# Patient Record
Sex: FEMALE | Race: WHITE | NOT HISPANIC OR LATINO | Employment: PART TIME | URBAN - METROPOLITAN AREA
[De-identification: names, ages, dates, MRNs, and addresses within clinical notes are randomized per-mention and may not be internally consistent; named-entity substitution may affect disease eponyms.]

---

## 2023-11-09 ENCOUNTER — TELEPHONE (OUTPATIENT)
Dept: OBSTETRICS AND GYNECOLOGY | Facility: CLINIC | Age: 18
End: 2023-11-09
Payer: COMMERCIAL

## 2023-11-10 ENCOUNTER — OFFICE VISIT (OUTPATIENT)
Dept: OBSTETRICS AND GYNECOLOGY | Facility: CLINIC | Age: 18
End: 2023-11-10
Payer: COMMERCIAL

## 2023-11-10 VITALS
BODY MASS INDEX: 23.34 KG/M2 | SYSTOLIC BLOOD PRESSURE: 106 MMHG | HEIGHT: 70 IN | WEIGHT: 163 LBS | DIASTOLIC BLOOD PRESSURE: 64 MMHG

## 2023-11-10 DIAGNOSIS — F41.8 DEPRESSION WITH ANXIETY: ICD-10-CM

## 2023-11-10 DIAGNOSIS — Z30.09 ENCOUNTER FOR OTHER GENERAL COUNSELING OR ADVICE ON CONTRACEPTION: ICD-10-CM

## 2023-11-10 DIAGNOSIS — Z71.85 HPV VACCINE COUNSELING: ICD-10-CM

## 2023-11-10 DIAGNOSIS — Z11.3 SCREENING FOR STD (SEXUALLY TRANSMITTED DISEASE): Primary | ICD-10-CM

## 2023-11-10 DIAGNOSIS — Z01.419 WOMEN'S ANNUAL ROUTINE GYNECOLOGICAL EXAMINATION: ICD-10-CM

## 2023-11-10 NOTE — PROGRESS NOTES
Gynecologic Annual Exam Note        Establish Care        Subjective     HPI  Laura Thomas is a 18 y.o.  female who presents for annual well woman exam as a new patient. There were no changes to her medical or surgical history since her last visit.. Patient reports problems with: none. Patient's last menstrual period was 2023 (approximate).. Her periods occur every 25-35 days , lasting >7 days. The flow is light to moderate.. She reports dysmenorrhea is moderate, occurring first 1-2 days of flow. Partner Status: Marital Status: single.  She is sexually active. She has had new partners.. STD testing recommendations have been explained to the patient and she does desire STD testing.    She has a long history of depression and anxiety.  She sees a therapist regularly and has been on and off several different medications.  She is happy with her current mental status, declines discussion/med management/referral.    Additional OB/GYN History   Current contraception: contraceptive methods: Condoms  Desires to:  discuss  contraception  Thromboembolic Disease: none  Age of menarche: 11    History of STD: yes GC/CHLAMYDIA    Last Pap :  never    Gardasil status: not received  Family history of uterine, colon, breast, or ovarian cancer: yes - Breast: MGM  Performs monthly Self-Breast Exam: education provided  Exercises Regularly:no  Feelings of Anxiety or Depression: yes - pt has hx of anxiety and depression; would like to discuss  Tobacco Usage?: Pt vapes     No current outpatient medications on file.     Patient denies the need for medication refills today.    OB History          0    Para   0    Term   0       0    AB   0    Living   0         SAB   0    IAB   0    Ectopic   0    Molar   0    Multiple   0    Live Births   0                Health Maintenance   Topic Date Due    HPV VACCINES (1 - 2-dose series) Never done    TDAP/TD VACCINES (1 - Tdap) Never done    INFLUENZA VACCINE  2023  "   COVID-19 Vaccine (3 - 2023-24 season) 09/01/2023    HEPATITIS C SCREENING  Never done    ANNUAL PHYSICAL  Never done    CHLAMYDIA SCREENING  Never done    MENINGOCOCCAL VACCINE  Completed    Pneumococcal Vaccine 0-64  Aged Out       Past Medical History:   Diagnosis Date    Anxiety and depression     Gonorrhea 20021        History reviewed. No pertinent surgical history.    The additional following portions of the patient's history were reviewed and updated as appropriate: allergies, current medications, past family history, past medical history, past social history, past surgical history, and problem list.    Review of Systems   Constitutional: Negative.    HENT: Negative.     Eyes: Negative.    Respiratory: Negative.     Cardiovascular: Negative.    Gastrointestinal: Negative.    Endocrine: Negative.    Genitourinary: Negative.    Musculoskeletal: Negative.    Skin: Negative.    Allergic/Immunologic: Negative.    Neurological: Negative.    Hematological: Negative.    Psychiatric/Behavioral: Negative.           I have reviewed and agree with the HPI, ROS, and historical information as entered above. Jenna Mendosa, APRN          Objective   /64   Ht 177.8 cm (70\")   Wt 73.9 kg (163 lb)   LMP 11/01/2023 (Approximate)   BMI 23.39 kg/m²     Physical Exam  Vitals and nursing note reviewed. Exam conducted with a chaperone present.   Constitutional:       General: She is not in acute distress.     Appearance: Normal appearance. She is well-developed and normal weight. She is not ill-appearing.   Neck:      Thyroid: No thyroid mass or thyromegaly.   Pulmonary:      Effort: Pulmonary effort is normal. No respiratory distress or retractions.   Chest:      Chest wall: No mass.   Breasts:     Right: Normal. No mass, nipple discharge, skin change or tenderness.      Left: Normal. No mass, nipple discharge, skin change or tenderness.   Abdominal:      General: There is no distension.      Palpations: Abdomen is " soft. Abdomen is not rigid. There is no mass.      Tenderness: There is no abdominal tenderness. There is no guarding or rebound.      Hernia: No hernia is present. There is no hernia in the left inguinal area.   Genitourinary:     General: Normal vulva.      Labia:         Right: No rash, tenderness or lesion.         Left: No rash, tenderness or lesion.       Vagina: Normal. No vaginal discharge or lesions.      Cervix: Normal.      Uterus: Normal. Not enlarged, not fixed and not tender.       Adnexa: Right adnexa normal and left adnexa normal.        Right: No mass or tenderness.          Left: No mass or tenderness.        Rectum: No external hemorrhoid.   Musculoskeletal:      Cervical back: No muscular tenderness.   Skin:     General: Skin is warm and dry.   Neurological:      Mental Status: She is alert and oriented to person, place, and time.   Psychiatric:         Mood and Affect: Mood normal.         Behavior: Behavior normal.          Assessment and Plan    Problem List Items Addressed This Visit    None  Visit Diagnoses       Screening for STD (sexually transmitted disease)    -  Primary    Relevant Orders    Chlamydia trachomatis, Neisseria gonorrhoeae, Trichomonas vaginalis, PCR - Swab, Cervix    Women's annual routine gynecological examination        HPV vaccine counseling        Encounter for other general counseling or advice on contraception        Depression with anxiety                GYN annual well woman exam.   Reviewed pap guidelines.   Encouraged use of condoms for STD prevention.  Reviewed monthly self breast exams.  Instructed to call with lumps, pain, or breast discharge.    Reviewed exercise as a preventative health measures.   Reccommended Flu Vaccine in Fall of each year.  RTC in 1 year or PRN with problems  Return in about 1 year (around 11/10/2024) for Annual physical.  Rev Gardasil vaccine; she declines.  Offered to review contraceptive options; she declines and will continue  condoms.  She does not want to discuss mental health today.  See note above.      Jenna Mendosa, APRN  11/10/2023

## 2023-11-14 LAB
C TRACH RRNA SPEC QL NAA+PROBE: NEGATIVE
N GONORRHOEA RRNA SPEC QL NAA+PROBE: NEGATIVE
T VAGINALIS RRNA SPEC QL NAA+PROBE: NEGATIVE

## 2023-11-29 ENCOUNTER — OFFICE VISIT (OUTPATIENT)
Dept: OBSTETRICS AND GYNECOLOGY | Facility: CLINIC | Age: 18
End: 2023-11-29
Payer: COMMERCIAL

## 2023-11-29 VITALS
DIASTOLIC BLOOD PRESSURE: 60 MMHG | SYSTOLIC BLOOD PRESSURE: 110 MMHG | WEIGHT: 164.4 LBS | HEIGHT: 70 IN | BODY MASS INDEX: 23.54 KG/M2

## 2023-11-29 DIAGNOSIS — N90.9 LESION OF FEMALE PERINEUM: Primary | ICD-10-CM

## 2023-11-29 DIAGNOSIS — A60.04 HERPES, VULVAR: Primary | ICD-10-CM

## 2023-11-29 RX ORDER — VALACYCLOVIR HYDROCHLORIDE 1 G/1
1000 TABLET, FILM COATED ORAL 2 TIMES DAILY
Qty: 20 TABLET | Refills: 0 | Status: SHIPPED | OUTPATIENT
Start: 2023-11-29

## 2023-11-29 RX ORDER — VALACYCLOVIR HYDROCHLORIDE 1 G/1
1000 TABLET, FILM COATED ORAL DAILY
Qty: 30 TABLET | Refills: 12 | Status: SHIPPED | OUTPATIENT
Start: 2023-11-29

## 2023-11-29 NOTE — PROGRESS NOTES
Chief Complaint   Patient presents with    Vulvar Issues         Subjective   HPI  Laura Thomas is a 18 y.o. female, , who presents with her mother today for evaluation of vulvar lesion(s). This is the first occurrence.    She states she has experienced this problem for 5 days.  Since she first discovered the issue it has worsened . She describes the severity as localized but severe. Patient notes aggravating factors include sitting and alleviating factors are keeping dry.  The patient reports additional symptoms as none.       Patient's last menstrual period was 2023 (approximate)..  Partner Status: single.  New Partners since last visit: no.  Desires STD Screenin/10/2023-Negative. Her STD history is significant for:  chlamydia, GC.     She states that she had a partner a few months ago that was older and was a one time thing, they didn't use a condom, but this is the only time that she hadn't.       Additional OB/GYN History   Last Pap :   Last Completed Pap Smear       This patient has no relevant Health Maintenance data.            Tobacco Usage?: No       Current Outpatient Medications:     valACYclovir (Valtrex) 1000 MG tablet, Take 1 tablet by mouth 2 (Two) Times a Day., Disp: 20 tablet, Rfl: 0    valACYclovir (Valtrex) 1000 MG tablet, Take 1 tablet by mouth Daily. Start after taking treatment dose, Disp: 30 tablet, Rfl: 12     Past Medical History:   Diagnosis Date    Anxiety and depression     Gonorrhea         History reviewed. No pertinent surgical history.    The additional following portions of the patient's history were reviewed and updated as appropriate: allergies, current medications, past family history, past medical history, past social history, and past surgical history.    Review of Systems   Constitutional: Negative.    Respiratory: Negative.     Cardiovascular: Negative.    Gastrointestinal: Negative.    Genitourinary:  Positive for genital sores.  "  Psychiatric/Behavioral: Negative.         I have reviewed and agree with the HPI, ROS, and historical information as entered above. NAS Leung      Objective   /60 (BP Location: Right arm, Patient Position: Sitting, Cuff Size: Adult)   Ht 177.8 cm (70\")   Wt 74.6 kg (164 lb 6.4 oz)   LMP 11/01/2023 (Approximate)   BMI 23.59 kg/m²     Physical Exam  Vitals and nursing note reviewed. Exam conducted with a chaperone present.   Constitutional:       Appearance: Normal appearance. She is normal weight.   Pulmonary:      Effort: Pulmonary effort is normal.   Genitourinary:     Exam position: Lithotomy position.      Labia:         Right: Lesion present.         Left: Lesion present.           Comments: Lesions present bilateral inner labia. Ulcerated with yellow base and red border. Culture obtained from both lesions.   Speculum exam not performed.   Skin:     General: Skin is warm and dry.   Neurological:      Mental Status: She is alert and oriented to person, place, and time.   Psychiatric:         Attention and Perception: Attention normal.         Mood and Affect: Affect is tearful.         Behavior: Behavior is cooperative.      Comments: Tearful after mother's outburst and exit from the room.          After exam, mother was visibly agitated. Asked me to talk to patient about birth control. The patient declined, saying she didn't want to talk about it right now due to the nature of the visit, and that she isn't having sex right now.  Mother became aggressive and started using profane language towards her daughter.   APRN let the mother know it is inappropriate to talk to her daughter in that way, especially in a medical office, and will not be tolerated. She exited the room of her own volition.       Assessment & Plan         Problem List Items Addressed This Visit       Herpes, vulvar - Primary    Relevant Medications    valACYclovir (Valtrex) 1000 MG tablet    valACYclovir (Valtrex) 1000 MG " tablet         Plan       Discussed HSV diagnosis. Lesions are c/w HSV2. Culture was sent, but we will go ahead and treat now.  Discussed suppressive dose after treatment dose, pt will take daily to prevent outbreaks.   Discussed safe sex practices in detail. She can spread HSV even while using condoms while it is active, so should refrain from sex altogether when having an outbreak.   Mother mentioned birth control- pt is not interested in this at this point. She is not currently sexually active. She understands that without being on birth control of some sort, she is not protected against pregnancy and needs to at the minimum use condoms, which she states she will be using every time in the future anyways. She is not currently interested in talking about other options than OCP, but may be interested in IUD/nexplanon in the future. Encouraged pt to come in when she wants to discuss it or is going to become sexually active again.         Verona Joiner, APRN  11/29/2023

## 2023-11-30 PROBLEM — A60.04 HERPES, VULVAR: Status: ACTIVE | Noted: 2023-11-30

## 2023-12-04 LAB
HSV1 DNA SPEC QL NAA+PROBE: NEGATIVE
HSV2 DNA SPEC QL NAA+PROBE: NEGATIVE
SPECIMEN STATUS: NORMAL

## 2024-01-29 ENCOUNTER — TELEPHONE (OUTPATIENT)
Dept: OBSTETRICS AND GYNECOLOGY | Facility: CLINIC | Age: 19
End: 2024-01-29
Payer: COMMERCIAL

## 2024-01-29 DIAGNOSIS — N76.0 ACUTE VAGINITIS: Primary | ICD-10-CM

## 2024-01-29 RX ORDER — FLUCONAZOLE 150 MG/1
TABLET ORAL
Qty: 2 TABLET | Refills: 1 | Status: SHIPPED | OUTPATIENT
Start: 2024-01-29

## 2024-01-29 NOTE — TELEPHONE ENCOUNTER
Attempted to call pt but it says number not in service. Rx sent- however if this does not resolve she needs to come in and have cultures done.

## 2024-02-19 ENCOUNTER — OFFICE VISIT (OUTPATIENT)
Age: 19
End: 2024-02-19
Payer: COMMERCIAL

## 2024-02-19 DIAGNOSIS — F32.A DEPRESSION, UNSPECIFIED DEPRESSION TYPE: Primary | ICD-10-CM

## 2024-02-19 DIAGNOSIS — F41.9 ANXIETY DISORDER, UNSPECIFIED TYPE: ICD-10-CM

## 2024-02-19 PROCEDURE — 90832 PSYTX W PT 30 MINUTES: CPT
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
## 2024-02-27 ENCOUNTER — OFFICE VISIT (OUTPATIENT)
Age: 19
End: 2024-02-27
Payer: COMMERCIAL

## 2024-02-27 DIAGNOSIS — F32.A DEPRESSION, UNSPECIFIED DEPRESSION TYPE: Primary | ICD-10-CM

## 2024-02-27 NOTE — PROGRESS NOTES
Follow Up Adult Note     Date:2024   Client Name: Laura Thomas  : 2005   MRN: [id number]   Time IN: 1:29pm    Time OUT: 1:16pm     Referring Provider: Torri Morfin MD    Chief Complaint:      ICD-10-CM ICD-9-CM   1. Depression, unspecified depression type  F32.A 311        History of Present Illness:   Laura Thomas is a 18 y.o. female who is being seen today for follow up individual psychotherapy counseling for depressive symptoms.  Pt shared that she has continued to feel a lack of energy or low motivation to engage in normal activities.  Pt shared that she would like to find a job but finds the task difficult due to transportation barriers and lack of general interest or desire.       Objective     PHQ-9 Depression Screening  Little interest or pleasure in doing things? 1-->several days   Feeling down, depressed, or hopeless? 0-->not at all   Trouble falling or staying asleep, or sleeping too much? 2-->more than half the days   Feeling tired or having little energy? 1-->several days   Poor appetite or overeating? 1-->several days   Feeling bad about yourself - or that you are a failure or have let yourself or your family down? 0-->not at all   Trouble concentrating on things, such as reading the newspaper or watching television? 1-->several days   Moving or speaking so slowly that other people could have noticed? Or the opposite - being so fidgety or restless that you have been moving around a lot more than usual? 0-->not at all   Thoughts that you would be better off dead, or of hurting yourself in some way? 0-->not at all   PHQ-9 Total Score 6   If you checked off any problems, how difficult have these problems made it for you to do your work, take care of things at home, or get along with other people? somewhat difficult      PRISCILA-7  Feeling nervous, anxious or on edge: Several days  Not being able to stop or control worrying: Not at all  Worrying too much about different things: Not at  all  Trouble Relaxing: Several days  Being so restless that it is hard to sit still: Not at all  Feeling afraid as if something awful might happen: Not at all  Becoming easily annoyed or irritable: More than half the days  PRISCILA 7 Total Score: 4  If you checked any problems, how difficult have these problems made it for you to do your work, take care of things at home, or get along with other people: Somewhat difficult    Client's Support Network Includes:  parents    Functional Status: Mild impairment     Progress toward goal: Not at goal    Prognosis: Good with Ongoing Treatment     Medications:     Current Outpatient Medications:     fluconazole (Diflucan) 150 MG tablet, Take one now and repeat in 3 days., Disp: 2 tablet, Rfl: 1    valACYclovir (Valtrex) 1000 MG tablet, Take 1 tablet by mouth 2 (Two) Times a Day., Disp: 20 tablet, Rfl: 0    valACYclovir (Valtrex) 1000 MG tablet, Take 1 tablet by mouth Daily. Start after taking treatment dose, Disp: 30 tablet, Rfl: 12    Allergies:   No Known Allergies      Subjective     Mental Status Exam:   MENTAL STATUS EXAM   General Appearance:  Cleanly groomed and dressed  Eye Contact:  Good eye contact  Attitude:  Cooperative  Motor Activity:  Normal gait, posture  Speech:  Normal rate, tone, volume  Mood and affect:  Bored  Hopelessness:  1  Thought Process:  Logical and goal-directed  Associations/ Thought Content:  No delusions  Hallucinations:  None  Suicidal Ideations:  Not present  Homicidal Ideation:  Not present  Sensorium:  Alert  Orientation:  Person, place, time and situation  Immediate Recall, Recent, and Remote Memory:  Intact  Attention Span/ Concentration:  Good  Insight:  Good  Judgement:  Good  Reliability:  Good  Impulse Control:  Good       Assessment / Plan / Progress     Visit Diagnosis/Orders Placed This Visit:    ICD-10-CM ICD-9-CM   1. Depression, unspecified depression type  F32.A 311        SUICIDE RISK ASSESSMENT/CSSRS:  1. Does client have  "thoughts of suicide? Patient denies   2. Does client have intent for suicide? Patient denies   3. Does client have a current plan for suicide? Patient denies   4. History of suicide attempts: Patient denies   5. Family history of suicide or attempts: Patient denies   6. History of violent behaviors towards others or property or thoughts of committing suicide: Patient denies   7. History of sexual aggression toward others: Patient denies   8. Access to firearms or weapons: Patient denies     PLAN:  Safety: No acute safety concerns  Risk Assessment: Risk of self-harm acutely is low. Risk of self-harm chronically is also low, but could be further elevated in the event of treatment noncompliance and/or AODA.    Treatment Plan/Goals & Progress: Continue supportive psychotherapy efforts and medications as indicated. Treatment options discussed during today's visit. Client ackowledged and verbally consented to continue with current treatment plan and was educated on the importance of compliance with treatment and follow-up appointments. Client seems reasonably able to adhere to treatment plan.      Assisted client in processing above session content; acknowledged and normalized client’s thoughts, feelings, and concerns.  Rationalized client's thought process regarding current life stressors that have a significant impact on mood.  Pt shared that she would like to look for a job but is unsure of what type of work she would enjoy doing.  Pt stated that she would prefer to move out of [place] in effort to \"get a new start.\"  Pt discussed feelings of frustration in regard to her reputation locally due to past relationships and events.  Pt identified throughout the session periods of feeling lonely or isolated due to her family not being readily available.  Pt also acknowledged feelings of being \"left out\" due to friends moving out of the area. Therapist affirmed pt's awareness of these uncomfortable emotions and challenged pt to " "consider how this may serve as direction or motivation for future goals.  Pt discussed frustrations with her relationship to men in general as they only want pt for her \"body or money.\"  Pt shared experience of her ex-boyfriend and recalled elements of their relationship that were helpful or missed.  Therapist assisted pt in allowing experience to inform her choices now.  Therapist challenged pt's discrepancies regarding behaviors she shows to me in connection with feelings of being \"used.\"  Pt acknowledge discrepancies and identified challenge to change behaviors due to current lack of job or environment.  Pt agreed to continue to search for jobs before next session.      Allowed client to freely discuss issues without interruption or judgement with unconditional positive regard, active listening skills, and empathy. Clinician provided a safe, confidential environment to facilitate the development of a positive therapeutic relationship and encouraged open, honest communication. Assisted client in identifying risk factors which would indicate the need for higher level of care including thoughts to harm self or others and/or self-harming behavior and encouraged client to contact this office, call 911, or present to the nearest emergency room should any of these events occur. Discussed crisis intervention services and means to access. Client adamantly and convincingly denies current suicidal or homicidal ideation or perceptual disturbance. Assisted client in processing session content; acknowledged and normalized client’s thoughts, feelings, and concerns by utilizing a person-centered approach in efforts to build appropriate rapport and a positive therapeutic relationship with open and honest communication.     Quality Measures:     TOBACCO USE:  Tobacco Use: Unknown (11/30/2023)    Patient History     Smoking Tobacco Use: Never     Smokeless Tobacco Use: Unknown     Passive Exposure: Not on file      Never " smoker    I advised Laura of the risks of tobacco use.     Follow Up:   Return in about 2 weeks (around 3/12/2024).    Verona Deal LCSW  [facility] [address]

## 2024-03-12 ENCOUNTER — OFFICE VISIT (OUTPATIENT)
Age: 19
End: 2024-03-12
Payer: COMMERCIAL

## 2024-03-12 DIAGNOSIS — F32.A DEPRESSION, UNSPECIFIED DEPRESSION TYPE: Primary | ICD-10-CM

## 2024-03-12 NOTE — PROGRESS NOTES
Follow Up Adult Note     Date:2024   Client Name: Laura Thomas  : 2005   MRN: [id number]   Time IN: 1:35 pm    Time OUT: 2:20 pm     Referring Provider: Torri Morfin MD    Chief Complaint:      ICD-10-CM ICD-9-CM   1. Depression, unspecified depression type  F32.A 311        History of Present Illness:   Laura Thomas is a 18 y.o. female who is being seen today for follow up individual psychotherapy counseling ongoing depressive symptoms.   Pt reports that it has been a difficult two weeks due to social issues and concerns.  Pt's parents have continued to place pressure on pt to find a job and become more self-sufficient.  Pt states that it is difficult to find the motivation due to lack of interest or feeling the need to get a job.        Objective     PHQ-9 Depression Screening  Little interest or pleasure in doing things? 2-->more than half the days   Feeling down, depressed, or hopeless? 2-->more than half the days   Trouble falling or staying asleep, or sleeping too much? 2-->more than half the days   Feeling tired or having little energy? 2-->more than half the days   Poor appetite or overeating? 2-->more than half the days   Feeling bad about yourself - or that you are a failure or have let yourself or your family down? 2-->more than half the days   Trouble concentrating on things, such as reading the newspaper or watching television? 0-->not at all   Moving or speaking so slowly that other people could have noticed? Or the opposite - being so fidgety or restless that you have been moving around a lot more than usual? 1-->several days   Thoughts that you would be better off dead, or of hurting yourself in some way? 1-->several days   PHQ-9 Total Score 14   If you checked off any problems, how difficult have these problems made it for you to do your work, take care of things at home, or get along with other people? somewhat difficult      PRISCILA-7  Feeling nervous, anxious or on edge: More  than half the days  Not being able to stop or control worrying: More than half the days  Worrying too much about different things: More than half the days  Trouble Relaxing: More than half the days  Being so restless that it is hard to sit still: More than half the days  Feeling afraid as if something awful might happen: More than half the days  Becoming easily annoyed or irritable: More than half the days  PRISCILA 7 Total Score: 14  If you checked any problems, how difficult have these problems made it for you to do your work, take care of things at home, or get along with other people: Somewhat difficult    Client's Support Network Includes:  parents and friends    Functional Status: Mild impairment     Progress toward goal: Not at goal    Prognosis: Good with Ongoing Treatment     Medications:     Current Outpatient Medications:     fluconazole (Diflucan) 150 MG tablet, Take one now and repeat in 3 days., Disp: 2 tablet, Rfl: 1    valACYclovir (Valtrex) 1000 MG tablet, Take 1 tablet by mouth 2 (Two) Times a Day., Disp: 20 tablet, Rfl: 0    valACYclovir (Valtrex) 1000 MG tablet, Take 1 tablet by mouth Daily. Start after taking treatment dose, Disp: 30 tablet, Rfl: 12    Allergies:   No Known Allergies      Subjective     Mental Status Exam:   MENTAL STATUS EXAM   General Appearance:  Cleanly groomed and dressed  Eye Contact:  Good eye contact  Attitude:  Cooperative and polite  Motor Activity:  Normal gait, posture  Speech:  Normal rate, tone, volume  Mood and affect:  Normal, pleasant  Hopelessness:  Denies  Loneliness: Denies  Thought Process:  Logical and goal-directed  Associations/ Thought Content:  No delusions  Hallucinations:  None  Suicidal Ideations:  Not present  Homicidal Ideation:  Not present  Sensorium:  Alert and clear  Orientation:  Person, place, time and situation  Immediate Recall, Recent, and Remote Memory:  Intact  Attention Span/ Concentration:  Good  Insight:  Good  Judgement:   Good  Reliability:  Good  Impulse Control:  Good       Assessment / Plan / Progress     Visit Diagnosis/Orders Placed This Visit:    ICD-10-CM ICD-9-CM   1. Depression, unspecified depression type  F32.A 311        SUICIDE RISK ASSESSMENT/CSSRS:  1. Does client have thoughts of suicide? Patient denies    2. Does client have intent for suicide? Patient denies   3. Does client have a current plan for suicide? Patient denies   4. History of suicide attempts: Patient denies   5. Family history of suicide or attempts: Patient denies   6. History of violent behaviors towards others or property or thoughts of committing suicide: Patient denies   7. History of sexual aggression toward others: Patient denies   8. Access to firearms or weapons: Patient denies     PLAN:  Safety: No acute safety concerns  Risk Assessment: Risk of self-harm acutely is low. Risk of self-harm chronically is also low, but could be further elevated in the event of treatment noncompliance and/or AODA.    Treatment Plan/Goals & Progress: Continue supportive psychotherapy efforts and medications as indicated. Treatment options discussed during today's visit. Client ackowledged and verbally consented to continue with current treatment plan and was educated on the importance of compliance with treatment and follow-up appointments. Client seems reasonably able to adhere to treatment plan.      Assisted client in processing above session content; acknowledged and normalized client’s thoughts, feelings, and concerns.  Rationalized client's thought process regarding awareness of depressed mood and impact on feelings of motivation and interest. Pt and therapist discussed pt's goals for life which included potentially going to [email or web address] school. Pt has been able to earn money through relationships on social media and feels that this has decreased pt's urgency to find another source of income.  Pt and therapist discussed additional perspective and  "opportunities that finding a job would lead to; CBT.  Therapist and pt explored various job options that were available that could be of interest to pt and support her towards her own goals.  Pt and therapist revisited pt's awareness of difficulties with relationships and feeling \"used\".  Therapist and pt discussed developing positive self-talk strategies to assist with developing a self-concept and self-esteem aside from physical attributes; CBT    Allowed client to freely discuss issues without interruption or judgement with unconditional positive regard, active listening skills, and empathy. Clinician provided a safe, confidential environment to facilitate the development of a positive therapeutic relationship and encouraged open, honest communication. Assisted client in identifying risk factors which would indicate the need for higher level of care including thoughts to harm self or others and/or self-harming behavior and encouraged client to contact this office, call 911, or present to the nearest emergency room should any of these events occur. Discussed crisis intervention services and means to access. Client adamantly and convincingly denies current suicidal or homicidal ideation or perceptual disturbance. Assisted client in processing session content; acknowledged and normalized client’s thoughts, feelings, and concerns by utilizing a person-centered approach in efforts to build appropriate rapport and a positive therapeutic relationship with open and honest communication.     Quality Measures:     TOBACCO USE:  Tobacco Use: Unknown (11/30/2023)    Patient History     Smoking Tobacco Use: Never     Smokeless Tobacco Use: Unknown     Passive Exposure: Not on file      Never smoker    I advised Laura of the risks of tobacco use.     Follow Up:   Return in about 2 weeks (around 3/26/2024).    Verona Deal LCSW  [facility] [address]   "

## 2024-03-26 ENCOUNTER — OFFICE VISIT (OUTPATIENT)
Age: 19
End: 2024-03-26
Payer: COMMERCIAL

## 2024-03-26 DIAGNOSIS — F33.1 MAJOR DEPRESSIVE DISORDER, RECURRENT EPISODE, MODERATE: Primary | ICD-10-CM

## 2024-03-26 NOTE — PROGRESS NOTES
Follow Up Adult Note     Date:2024   Client Name: Laura Thomas  : 2005   MRN: [id number]   Time IN: 1:45pm    Time OUT: 2:17pm     Referring Provider: Torri Morfin MD    Chief Complaint:      ICD-10-CM ICD-9-CM   1. Major depressive disorder, recurrent episode, moderate  F33.1 296.32        History of Present Illness:   Laura Thomas is a 18 y.o. female who is being seen today for follow up individual psychotherapy counseling ongoing depressive symptoms.  Pt shared that she continues to have low mood, energy and motivation to engage in goals towards finding a job.  Pt shared recent social outings with friends which is when pt feels most like herself.  Pt continues to express interest in finding a job and moving out of her home to establish more independence.         Objective     PHQ-9 Depression Screening  Little interest or pleasure in doing things? 0-->not at all   Feeling down, depressed, or hopeless? 0-->not at all   Trouble falling or staying asleep, or sleeping too much? 0-->not at all   Feeling tired or having little energy? 0-->not at all   Poor appetite or overeating? 0-->not at all   Feeling bad about yourself - or that you are a failure or have let yourself or your family down? 0-->not at all   Trouble concentrating on things, such as reading the newspaper or watching television? 0-->not at all   Moving or speaking so slowly that other people could have noticed? Or the opposite - being so fidgety or restless that you have been moving around a lot more than usual? 0-->not at all   Thoughts that you would be better off dead, or of hurting yourself in some way? 0-->not at all   PHQ-9 Total Score 0   If you checked off any problems, how difficult have these problems made it for you to do your work, take care of things at home, or get along with other people? not difficult at all      PRISCILA-7  Feeling nervous, anxious or on edge: Several days  Not being able to stop or control worrying:  Not at all  Worrying too much about different things: Several days  Trouble Relaxing: Several days  Being so restless that it is hard to sit still: More than half the days  Feeling afraid as if something awful might happen: Several days  Becoming easily annoyed or irritable: More than half the days  PRISCILA 7 Total Score: 8  If you checked any problems, how difficult have these problems made it for you to do your work, take care of things at home, or get along with other people: Not difficult at all      Medications:     Current Outpatient Medications:     fluconazole (Diflucan) 150 MG tablet, Take one now and repeat in 3 days., Disp: 2 tablet, Rfl: 1    valACYclovir (Valtrex) 1000 MG tablet, Take 1 tablet by mouth 2 (Two) Times a Day., Disp: 20 tablet, Rfl: 0    valACYclovir (Valtrex) 1000 MG tablet, Take 1 tablet by mouth Daily. Start after taking treatment dose, Disp: 30 tablet, Rfl: 12    Allergies:   No Known Allergies      Subjective     Mental Status Exam:   MENTAL STATUS EXAM   General Appearance:  Cleanly groomed and dressed  Eye Contact:  Good eye contact  Attitude:  Cooperative  Motor Activity:  Normal gait, posture  Speech:  Normal rate, tone, volume  Mood and affect:  Normal, pleasant  Thought Process:  Logical  Associations/ Thought Content:  No delusions  Hallucinations:  None  Suicidal Ideations:  Not present  Homicidal Ideation:  Not present  Sensorium:  Alert and clear  Orientation:  Person, place, time and situation  Immediate Recall, Recent, and Remote Memory:  Intact  Attention Span/ Concentration:  Good  Insight:  Good  Judgement:  Good  Reliability:  Good  Impulse Control:  Good       Client's Support Network Includes:   friends    Functional Status: Mild impairment     Progress toward goal: Not at goal    Prognosis: Good with Ongoing Treatment     Assessment / Plan / Progress     Visit Diagnosis/Orders Placed This Visit:    ICD-10-CM ICD-9-CM   1. Major depressive disorder, recurrent episode,  moderate  F33.1 296.32        SUICIDE RISK ASSESSMENT/CSSRS:  1. Does client have thoughts of suicide? Patient denies  2. Does client have intent for suicide? Patient denies  3. Does client have a current plan for suicide? Patient denies   4. History of suicide attempts: Patient denies   5. Family history of suicide or attempts: Patient denies   6. History of violent behaviors towards others or property or thoughts of committing suicide: Patient denies   7. History of sexual aggression toward others: Patient denies   8. Access to firearms or weapons: Patient denies     PLAN:  Safety: No acute safety concerns  Risk Assessment: Risk of self-harm acutely is low. Risk of self-harm chronically is also low, but could be further elevated in the event of treatment noncompliance and/or AODA.    Treatment Plan/Goals & Progress: Continue supportive psychotherapy efforts and medications as indicated. Treatment options discussed during today's visit. Client ackowledged and verbally consented to continue with current treatment plan and was educated on the importance of compliance with treatment and follow-up appointments. Client seems reasonably able to adhere to treatment plan.      Assisted client in processing above session content; acknowledged and normalized client’s thoughts, feelings, and concerns.  Rationalized client's thought process regarding awareness of ongoing depressive symptoms and impact on pt's overall goals.  Therapist assisted pt in identifying discrepancies between voiced goals and actions taken to achieve goals; Motivational interviewing.  Therapist and pt clarified pt's motivation to obtain independence rooted in the desire to move out from current home; CBT.  Pt and therapist explored pt's feelings regarding current relationships with male partners and how it contributes or hinders pt's progress or motivation toward goals; CBT.  Therapist and pt revisited behavioral activation goals of seeking employment  "during the month of April. Pt acknowledged awareness that distractions from relationships and \"partying\" have not been helpful in achieving her goals.          Allowed client to freely discuss issues without interruption or judgement with unconditional positive regard, active listening skills, and empathy. Clinician provided a safe, confidential environment to facilitate the development of a positive therapeutic relationship and encouraged open, honest communication. Assisted client in identifying risk factors which would indicate the need for higher level of care including thoughts to harm self or others and/or self-harming behavior and encouraged client to contact this office, call 911, or present to the nearest emergency room should any of these events occur. Discussed crisis intervention services and means to access. Client adamantly and convincingly denies current suicidal or homicidal ideation or perceptual disturbance. Assisted client in processing session content; acknowledged and normalized client’s thoughts, feelings, and concerns by utilizing a person-centered approach in efforts to build appropriate rapport and a positive therapeutic relationship with open and honest communication.     Quality Measures:     TOBACCO USE:  Tobacco Use: Low Risk  (3/13/2024)    Patient History     Smoking Tobacco Use: Never     Smokeless Tobacco Use: Never     Passive Exposure: Not on file      Never smoker    I advised Laura of the risks of tobacco use.     Follow Up:   Return in about 2 weeks (around 4/9/2024).    Verona Deal LCSW  [facility]   "

## 2024-04-12 ENCOUNTER — OFFICE VISIT (OUTPATIENT)
Age: 19
End: 2024-04-12
Payer: COMMERCIAL

## 2024-04-12 DIAGNOSIS — F33.1 MAJOR DEPRESSIVE DISORDER, RECURRENT EPISODE, MODERATE: Primary | ICD-10-CM

## 2024-04-12 NOTE — PROGRESS NOTES
"     Follow Up Adult Note     Date:2024   Client Name: Laura Thomas  : 2005   MRN: [id number]   Time IN: 2:35    Time OUT: 3:15pm     Referring Provider: Torri Morfin MD    Chief Complaint:      ICD-10-CM ICD-9-CM   1. Major depressive disorder, recurrent episode, moderate  F33.1 296.32        History of Present Illness:   Laura Thomas is a 18 y.o. female who is being seen today for follow up individual psychotherapy counseling for ongoing depressive symptoms.  Pt shared that mood continues to be affected by lack of structure or schedule.  Pt is currently working with sister to create a resume and start looking for jobs on [employer].  Pt is signed up for drivers education classes and is looking forward to getting her license back this summer.  Pt states that she is looking forward to \"getting my life together\" after her birthday and trip to celebrate with friends.  Pt continues to struggle with identity in local community and from social media accounts and would like to take a break because it is \"mentally exhausting.\"       Objective     PHQ-9 Depression Screening  Little interest or pleasure in doing things? 1-->several days   Feeling down, depressed, or hopeless? 0-->not at all   Trouble falling or staying asleep, or sleeping too much? 2-->more than half the days   Feeling tired or having little energy? 2-->more than half the days   Poor appetite or overeating? 2-->more than half the days   Feeling bad about yourself - or that you are a failure or have let yourself or your family down? 0-->not at all   Trouble concentrating on things, such as reading the newspaper or watching television? 1-->several days   Moving or speaking so slowly that other people could have noticed? Or the opposite - being so fidgety or restless that you have been moving around a lot more than usual? 0-->not at all   Thoughts that you would be better off dead, or of hurting yourself in some way? 0-->not at all   PHQ-9 Total " Score 8   If you checked off any problems, how difficult have these problems made it for you to do your work, take care of things at home, or get along with other people? not difficult at all      PRISCILA-7  Feeling nervous, anxious or on edge: Not at all  Not being able to stop or control worrying: Not at all  Worrying too much about different things: More than half the days  Trouble Relaxing: Several days  Being so restless that it is hard to sit still: Not at all  Feeling afraid as if something awful might happen: Not at all  Becoming easily annoyed or irritable: Not at all  PRISCILA 7 Total Score: 3  If you checked any problems, how difficult have these problems made it for you to do your work, take care of things at home, or get along with other people: Not difficult at all      Medications:     Current Outpatient Medications:     fluconazole (Diflucan) 150 MG tablet, Take one now and repeat in 3 days., Disp: 2 tablet, Rfl: 1    valACYclovir (Valtrex) 1000 MG tablet, Take 1 tablet by mouth 2 (Two) Times a Day., Disp: 20 tablet, Rfl: 0    valACYclovir (Valtrex) 1000 MG tablet, Take 1 tablet by mouth Daily. Start after taking treatment dose, Disp: 30 tablet, Rfl: 12    Allergies:   No Known Allergies      Subjective     Mental Status Exam:   MENTAL STATUS EXAM   General Appearance:  Cleanly groomed and dressed  Eye Contact:  Good eye contact  Attitude:  Cooperative and polite  Motor Activity:  Normal gait, posture and fidgety  Speech:  Normal rate, tone, volume  Mood and affect:  Normal, pleasant  Thought Process:  Logical and goal-directed  Associations/ Thought Content:  No delusions  Hallucinations:  None  Suicidal Ideations:  Not present  Homicidal Ideation:  Not present  Sensorium:  Alert  Orientation:  Person, place, time and situation  Immediate Recall, Recent, and Remote Memory:  Intact  Attention Span/ Concentration:  Good  Insight:  Good  Judgement:  Good  Reliability:  Good  Impulse Control:  Good        Client's Support Network Includes:   friends and sister    Functional Status: Mild impairment     Progress toward goal: Not at goal    Prognosis: Good with Ongoing Treatment     Assessment / Plan / Progress     Visit Diagnosis/Orders Placed This Visit:    ICD-10-CM ICD-9-CM   1. Major depressive disorder, recurrent episode, moderate  F33.1 296.32        SUICIDE RISK ASSESSMENT/CSSRS:  1. Does client have thoughts of suicide? Patient denies  2. Does client have intent for suicide? Patient denies  3. Does client have a current plan for suicide? Patient denies   4. History of suicide attempts: Patient denies   5. Family history of suicide or attempts: Patient denies   6. History of violent behaviors towards others or property or thoughts of committing suicide: Patient denies   7. History of sexual aggression toward others: Patient denies   8. Access to firearms or weapons: Patient denies     PLAN:  Safety: No acute safety concerns  Risk Assessment: Risk of self-harm acutely is low. Risk of self-harm chronically is also low, but could be further elevated in the event of treatment noncompliance and/or AODA.    Treatment Plan/Goals & Progress: Continue supportive psychotherapy efforts and medications as indicated. Treatment options discussed during today's visit. Client ackowledged and verbally consented to continue with current treatment plan and was educated on the importance of compliance with treatment and follow-up appointments. Client seems reasonably able to adhere to treatment plan.      Assisted client in processing above session content; acknowledged and normalized client’s thoughts, feelings, and concerns.  Rationalized client's thought process regarding awareness of the impact from lack of structure and stability.  Therapist and pt engaged in discussion regarding future goals and direction for life.  Therapist affirmed pt's willingness to consider changes to current activities. Therapist and pt discussed  making small goals to assist with achieving larger goals of finding a job and moving out.  Pt discussed impact of relationship with mom and environment growing up and how that influences certain struggles with relationships currently or with daily choices. Pt was challenged to consider how these experiences can provide meaning and purpose to ongoing decisions and direction for life.        Allowed client to freely discuss issues without interruption or judgement with unconditional positive regard, active listening skills, and empathy. Clinician provided a safe, confidential environment to facilitate the development of a positive therapeutic relationship and encouraged open, honest communication. Assisted client in identifying risk factors which would indicate the need for higher level of care including thoughts to harm self or others and/or self-harming behavior and encouraged client to contact this office, call 911, or present to the nearest emergency room should any of these events occur. Discussed crisis intervention services and means to access. Client adamantly and convincingly denies current suicidal or homicidal ideation or perceptual disturbance. Assisted client in processing session content; acknowledged and normalized client’s thoughts, feelings, and concerns by utilizing a person-centered approach in efforts to build appropriate rapport and a positive therapeutic relationship with open and honest communication.     Quality Measures:     TOBACCO USE:  Tobacco Use: Low Risk  (3/13/2024)    Patient History     Smoking Tobacco Use: Never     Smokeless Tobacco Use: Never     Passive Exposure: Not on file      Never smoker    I advised Laura of the risks of tobacco use.     Follow Up:   Return in about 2 weeks (around 4/26/2024).    Verona Deal LCSW  [facility] [address]

## 2024-06-07 ENCOUNTER — OFFICE VISIT (OUTPATIENT)
Age: 19
End: 2024-06-07
Payer: COMMERCIAL

## 2024-06-07 DIAGNOSIS — F33.1 MAJOR DEPRESSIVE DISORDER, RECURRENT EPISODE, MODERATE: Primary | ICD-10-CM

## 2024-06-07 NOTE — PROGRESS NOTES
"     Follow Up Adult Note     Date:2024   Client Name: Laura Thomas  : 2005   MRN: [id number]   Time IN: 10:05 am    Time OUT: 10:45 am     Referring Provider: Torri Morfin MD    Chief Complaint:      ICD-10-CM ICD-9-CM   1. Major depressive disorder, recurrent episode, moderate  F33.1 296.32        History of Present Illness:   Laura Thomas is a 19 y.o. female who is being seen today for follow up individual psychotherapy counseling for ongoing depression. Pt reports that mood continues to be the same and acknowledged ongoing relationship struggles with family as a primary source. Pt stated that she had a \"rough patch\" for a few weeks which has lead to increased depressive symptoms and awareness of negative thoughts about self. Pt expressed frustration with lack of interest or motivation towards any area of life or goals. Pt reports that she has recently been focused on \"partying\" due to birthday but has acknowledged that she needs to take steps towards goals of independence. Pt reports ongoing struggle with obtaining enough sleep.  Currently reports 3-4 hrs per night due to racing thoughts and worries.       Objective     Medications:     Current Outpatient Medications:     fluconazole (Diflucan) 150 MG tablet, Take one now and repeat in 3 days., Disp: 2 tablet, Rfl: 1    valACYclovir (Valtrex) 1000 MG tablet, Take 1 tablet by mouth 2 (Two) Times a Day., Disp: 20 tablet, Rfl: 0    valACYclovir (Valtrex) 1000 MG tablet, Take 1 tablet by mouth Daily. Start after taking treatment dose, Disp: 30 tablet, Rfl: 12    Allergies:   No Known Allergies    Mental Status Exam:   MENTAL STATUS EXAM   General Appearance:  Cleanly groomed and dressed  Eye Contact:  Good eye contact  Attitude:  Cooperative and polite  Motor Activity:  Normal gait, posture  Speech:  Normal rate, tone, volume  Mood and affect:  Normal, pleasant and frustrated  Thought Process:  Logical  Associations/ Thought Content:  No " delusions  Hallucinations:  None  Suicidal Ideations:  Not present  Homicidal Ideation:  Not present  Sensorium:  Alert  Orientation:  Person, place, time and situation  Immediate Recall, Recent, and Remote Memory:  Intact  Insight:  Good  Judgement:  Good  Reliability:  Good  Impulse Control:  Good       SUICIDE RISK ASSESSMENT/CSSRS:  1. Does client have thoughts of suicide? Patient denies  2. Does client have intent for suicide? Patient denies  3. Does client have a current plan for suicide? Patient denies   4. History of suicide attempts: Patient denies   5. Family history of suicide or attempts: Patient denies   6. History of violent behaviors towards others or property or thoughts of committing suicide: Patient denies   7. History of sexual aggression toward others: Patient denies   8. Access to firearms or weapons: Patient denies     Subjective     PHQ-9 Depression Screening  Little interest or pleasure in doing things? 0-->not at all   Feeling down, depressed, or hopeless? 1-->several days   Trouble falling or staying asleep, or sleeping too much? 3-->nearly every day   Feeling tired or having little energy? 3-->nearly every day   Poor appetite or overeating? 2-->more than half the days   Feeling bad about yourself - or that you are a failure or have let yourself or your family down? 1-->several days   Trouble concentrating on things, such as reading the newspaper or watching television? 1-->several days   Moving or speaking so slowly that other people could have noticed? Or the opposite - being so fidgety or restless that you have been moving around a lot more than usual? 0-->not at all   Thoughts that you would be better off dead, or of hurting yourself in some way? 0-->not at all   PHQ-9 Total Score 11   If you checked off any problems, how difficult have these problems made it for you to do your work, take care of things at home, or get along with other people? not difficult at all      PRISCILA-7  Feeling  nervous, anxious or on edge: Not at all  Not being able to stop or control worrying: Not at all  Worrying too much about different things: Not at all  Trouble Relaxing: More than half the days  Being so restless that it is hard to sit still: More than half the days  Feeling afraid as if something awful might happen: Not at all  Becoming easily annoyed or irritable: Not at all  PRISCILA 7 Total Score: 4  If you checked any problems, how difficult have these problems made it for you to do your work, take care of things at home, or get along with other people: Not difficult at all      Client's Support Network Includes:   friend    Functional Status: Mild impairment     Progress toward goal: Not at goal    Prognosis: Good with Ongoing Treatment     Assessment / Plan / Progress     Visit Diagnosis/Orders Placed This Visit:    ICD-10-CM ICD-9-CM   1. Major depressive disorder, recurrent episode, moderate  F33.1 296.32          PLAN:  Safety: No acute safety concerns  Risk Assessment: Risk of self-harm acutely is low. Risk of self-harm chronically is also low, but could be further elevated in the event of treatment noncompliance and/or AODA.    Treatment Plan/Goals & Progress: Continue supportive psychotherapy efforts and medications as indicated. Treatment options discussed during today's visit. Client ackowledged and verbally consented to continue with current treatment plan and was educated on the importance of compliance with treatment and follow-up appointments. Client seems reasonably able to adhere to treatment plan.      Assisted client in processing above session content; acknowledged and normalized client’s thoughts, feelings, and concerns.  Rationalized client's thought process regarding awareness of mood symptoms and impact on daily activities. Therapist assisted pt in reflecting upon recent events and underlying emotions that were tied to decision making. Therapist encouraged insight into past experiences have  shaped the pt's view of themselves and others. Therapist and pt reflected upon pt's self-esteem and self-worth in relation to others. Therapist encouraged pt to consider how their past experiences have shaped their view of themselves and others. Therapist elicited feedback from pt on current therapy progress and achievement towards personal goals. Therapist and pt discussed development of reasonable and realistic goals for obtaining license, getting a job, and finding an apartment.       Allowed client to freely discuss issues without interruption or judgement with unconditional positive regard, active listening skills, and empathy. Clinician provided a safe, confidential environment to facilitate the development of a positive therapeutic relationship and encouraged open, honest communication. Assisted client in identifying risk factors which would indicate the need for higher level of care including thoughts to harm self or others and/or self-harming behavior and encouraged client to contact this office, call 911, or present to the nearest emergency room should any of these events occur. Discussed crisis intervention services and means to access. Client adamantly and convincingly denies current suicidal or homicidal ideation or perceptual disturbance. Assisted client in processing session content; acknowledged and normalized client’s thoughts, feelings, and concerns by utilizing a person-centered approach in efforts to build appropriate rapport and a positive therapeutic relationship with open and honest communication.     Quality Measures:     TOBACCO USE:  Tobacco Use: Low Risk  (3/13/2024)    Patient History     Smoking Tobacco Use: Never     Smokeless Tobacco Use: Never     Passive Exposure: Not on file      Never smoker    I advised Laura of the risks of tobacco use.     Follow Up:   Return in about 2 weeks (around 6/21/2024).    Verona Deal LCSW  [facility] [address]

## 2024-06-19 ENCOUNTER — OFFICE VISIT (OUTPATIENT)
Age: 19
End: 2024-06-19
Payer: COMMERCIAL

## 2024-06-19 DIAGNOSIS — F33.1 MAJOR DEPRESSIVE DISORDER, RECURRENT EPISODE, MODERATE: Primary | ICD-10-CM

## 2024-06-19 NOTE — PROGRESS NOTES
"     Follow Up Adult Note     Date:2024   Client Name: Laura Thomas  : 2005   MRN: [id number]   Time IN: 1:30 pm    Time OUT: 2:17 pm     Referring Provider: Torri Morfin MD    Chief Complaint:      ICD-10-CM ICD-9-CM   1. Major depressive disorder, recurrent episode, moderate  F33.1 296.32        History of Present Illness:   Laura Thomas is a 19 y.o. female who is being seen today for follow up individual psychotherapy counseling for ongoing depression. Pt reports that trip to [place] went well but is pleased to be back. Pt stated that distance and time away seemed to have improved relationship with mother and feels as though tension is not as great. Pt acknowledged feelings of \"missing mom\" while away and has been more intentional in her responses to mother. Pt acknowledged awareness of the need to get her license and job in order to move out of house but acknowledged that she does have a lack of motivation or incentive.       Objective     Medications:     Current Outpatient Medications:     fluconazole (Diflucan) 150 MG tablet, Take one now and repeat in 3 days., Disp: 2 tablet, Rfl: 1    valACYclovir (Valtrex) 1000 MG tablet, Take 1 tablet by mouth 2 (Two) Times a Day., Disp: 20 tablet, Rfl: 0    valACYclovir (Valtrex) 1000 MG tablet, Take 1 tablet by mouth Daily. Start after taking treatment dose, Disp: 30 tablet, Rfl: 12    Allergies:   No Known Allergies    Mental Status Exam:   MENTAL STATUS EXAM   General Appearance:  Cleanly groomed and dressed  Eye Contact:  Good eye contact  Attitude:  Cooperative  Motor Activity:  Normal gait, posture and fidgety  Speech:  Normal rate, tone, volume  Mood and affect:  Normal, pleasant  Thought Process:  Logical  Associations/ Thought Content:  No delusions  Hallucinations:  None  Suicidal Ideations:  Not present  Homicidal Ideation:  Not present  Sensorium:  Alert  Orientation:  Person, place, time and situation  Immediate Recall, Recent, and Remote " Memory:  Intact  Insight:  Good  Judgement:  Good  Reliability:  Good  Impulse Control:  Good       SUICIDE RISK ASSESSMENT/CSSRS:  1. Does client have thoughts of suicide? Patient denies  2. Does client have intent for suicide? Patient denies  3. Does client have a current plan for suicide? Patient denies   4. History of suicide attempts: Yes   5. Family history of suicide or attempts: Patient denies   6. History of violent behaviors towards others or property or thoughts of committing suicide: Patient denies   7. History of sexual aggression toward others: Patient denies   8. Access to firearms or weapons: Patient denies     Subjective     PHQ-9 Depression Screening  Little interest or pleasure in doing things? 0-->not at all   Feeling down, depressed, or hopeless? 0-->not at all   Trouble falling or staying asleep, or sleeping too much? 2-->more than half the days   Feeling tired or having little energy? 2-->more than half the days   Poor appetite or overeating? 2-->more than half the days   Feeling bad about yourself - or that you are a failure or have let yourself or your family down? 0-->not at all   Trouble concentrating on things, such as reading the newspaper or watching television? 0-->not at all   Moving or speaking so slowly that other people could have noticed? Or the opposite - being so fidgety or restless that you have been moving around a lot more than usual? 0-->not at all   Thoughts that you would be better off dead, or of hurting yourself in some way? 0-->not at all   PHQ-9 Total Score 6   If you checked off any problems, how difficult have these problems made it for you to do your work, take care of things at home, or get along with other people? somewhat difficult      PRISCILA-7  Feeling nervous, anxious or on edge: Several days  Not being able to stop or control worrying: Not at all  Worrying too much about different things: Not at all  Trouble Relaxing: More than half the days  Being so restless  "that it is hard to sit still: More than half the days  Feeling afraid as if something awful might happen: Not at all  Becoming easily annoyed or irritable: Nearly every day  PRISCILA 7 Total Score: 8  If you checked any problems, how difficult have these problems made it for you to do your work, take care of things at home, or get along with other people: Somewhat difficult      Client's Support Network Includes:   friends    Functional Status: Mild impairment     Progress toward goal: Not at goal    Prognosis: Good with Ongoing Treatment     Assessment / Plan / Progress     Visit Diagnosis/Orders Placed This Visit:    ICD-10-CM ICD-9-CM   1. Major depressive disorder, recurrent episode, moderate  F33.1 296.32          PLAN:  Safety: No acute safety concerns  Risk Assessment: Risk of self-harm acutely is low. Risk of self-harm chronically is also low, but could be further elevated in the event of treatment noncompliance and/or AODA.    Treatment Plan/Goals & Progress: Continue supportive psychotherapy efforts and medications as indicated. Treatment options discussed during today's visit. Client ackowledged and verbally consented to continue with current treatment plan and was educated on the importance of compliance with treatment and follow-up appointments. Client seems reasonably able to adhere to treatment plan.      Assisted client in processing above session content; acknowledged and normalized client’s thoughts, feelings, and concerns.  Rationalized client's thought process regarding awareness of recent mood and impact on daily activities. Therapist assisted pt in reflecting upon recent events that elicited insight into feelings of appreciation and love for her mother and family. Therapist and pt explored pt's hesitation and lack of motivation towards finding a job and working towards independence. Pt stated that she feels \"lazy\" and acknowledged previous experiences were difficult due to working with the public " "and stressful interactions. Therapist assisted pt in considering opportunities that are present with independence that could create a stronger, positive sense of self. Pt acknowledged negative feelings about self that contributes to unhealthy patterns in relationships. Pt acknowledged that her relationships with men have been skewed since adolescence when her body was commented upon or viewed more than her personality. Pt acknowledged that she becomes \"blind\" in relationships and unhealthy patterns when material goods or benefits are included. Pt stated that patterns within relationship are now more comfortable than being willing to expose \"real\" self vs. \"Fake\" self. Pt provided additional insight that not knowing biological father's side of father continues to impact her personal identity and struggle to feel accepted in different communities. Therapist affirmed and acknowledged pt's willingness to explore self and share insight in effort to reflect upon impacts on current behaviors and actions and attempts to modify thinking patterns.       Allowed client to freely discuss issues without interruption or judgement with unconditional positive regard, active listening skills, and empathy. Clinician provided a safe, confidential environment to facilitate the development of a positive therapeutic relationship and encouraged open, honest communication. Assisted client in identifying risk factors which would indicate the need for higher level of care including thoughts to harm self or others and/or self-harming behavior and encouraged client to contact this office, call 911, or present to the nearest emergency room should any of these events occur. Discussed crisis intervention services and means to access. Client adamantly and convincingly denies current suicidal or homicidal ideation or perceptual disturbance. Assisted client in processing session content; acknowledged and normalized client’s thoughts, feelings, and " concerns by utilizing a person-centered approach in efforts to build appropriate rapport and a positive therapeutic relationship with open and honest communication.     Quality Measures:     TOBACCO USE:  Tobacco Use: Low Risk  (3/13/2024)    Patient History     Smoking Tobacco Use: Never     Smokeless Tobacco Use: Never     Passive Exposure: Not on file      Never smoker    I advised Laura of the risks of tobacco use.     Follow Up:   Return in about 2 weeks (around 7/3/2024).    Verona Deal LCSW  [facility] [address]

## 2024-08-06 ENCOUNTER — OFFICE VISIT (OUTPATIENT)
Age: 19
End: 2024-08-06
Payer: COMMERCIAL

## 2024-08-06 DIAGNOSIS — F33.1 MAJOR DEPRESSIVE DISORDER, RECURRENT EPISODE, MODERATE: Primary | ICD-10-CM

## 2024-08-06 PROCEDURE — 90834 PSYTX W PT 45 MINUTES: CPT
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
## 2024-08-14 PROBLEM — J02.9 SORE THROAT: Status: ACTIVE | Noted: 2024-08-14

## 2024-08-15 ENCOUNTER — PATIENT ROUNDING (BHMG ONLY) (OUTPATIENT)
Dept: URGENT CARE | Facility: CLINIC | Age: 19
End: 2024-08-15
Payer: COMMERCIAL

## 2024-08-21 ENCOUNTER — OFFICE VISIT (OUTPATIENT)
Age: 19
End: 2024-08-21
Payer: COMMERCIAL

## 2024-08-21 DIAGNOSIS — F33.1 MAJOR DEPRESSIVE DISORDER, RECURRENT EPISODE, MODERATE: Primary | ICD-10-CM

## 2024-08-21 PROCEDURE — 90834 PSYTX W PT 45 MINUTES: CPT

## 2024-08-21 NOTE — PROGRESS NOTES
Follow Up Adult Note     Date:2024   Client Name: Laura Thomas  : 2005   MRN: [id number]   Time IN: 1:39 pm    Time OUT: 2:17pm     Referring Provider: Torri Morfin MD    Chief Complaint:      ICD-10-CM ICD-9-CM   1. Major depressive disorder, recurrent episode, moderate  F33.1 296.32        History of Present Illness:   Laura Thomas is a 19 y.o. female who is being seen today for follow up individual psychotherapy counseling for ongoing depression. Pt reports that mood has improved slightly since last session. Pt was pleased to report that she has been working more consistently and earning own income. Pt shared that she feels more motivated to make progress towards personal goals, including driving and obtaining own housing.        Objective     Medications:     Current Outpatient Medications:     fluconazole (Diflucan) 150 MG tablet, Take one now and repeat in 3 days., Disp: 2 tablet, Rfl: 1    valACYclovir (Valtrex) 1000 MG tablet, Take 1 tablet by mouth 2 (Two) Times a Day., Disp: 20 tablet, Rfl: 0    valACYclovir (Valtrex) 1000 MG tablet, Take 1 tablet by mouth Daily. Start after taking treatment dose, Disp: 30 tablet, Rfl: 12    Allergies:   No Known Allergies    Mental Status Exam:   MENTAL STATUS EXAM   General Appearance:  Cleanly groomed and dressed  Eye Contact:  Good eye contact  Attitude:  Cooperative and polite  Motor Activity:  Normal gait, posture  Speech:  Normal rate, tone, volume  Mood and affect:  Normal, pleasant  Thought Process:  Logical, goal-directed and linear  Associations/ Thought Content:  No delusions  Hallucinations:  None  Suicidal Ideations:  Not present  Homicidal Ideation:  Not present  Sensorium:  Alert  Orientation:  Person, place, time and situation  Immediate Recall, Recent, and Remote Memory:  Intact  Attention Span/ Concentration:  Good  Insight:  Good  Judgement:  Good  Reliability:  Good  Impulse Control:  Good       SUICIDE RISK  ASSESSMENT/CSSRS:  1. Does client have thoughts of suicide? Patient denies  2. Does client have intent for suicide? Patient denies  3. Does client have a current plan for suicide? Patient denies   4. History of suicide attempts: Patient denies   5. Family history of suicide or attempts: Patient denies   6. History of violent behaviors towards others or property or thoughts of committing suicide: Patient denies   7. History of sexual aggression toward others: Patient denies   8. Access to firearms or weapons: Patient denies     Subjective     PHQ-9 Depression Screening  Little interest or pleasure in doing things? 0-->not at all   Feeling down, depressed, or hopeless? 0-->not at all   Trouble falling or staying asleep, or sleeping too much? 2-->more than half the days   Feeling tired or having little energy? 2-->more than half the days   Poor appetite or overeating? 2-->more than half the days   Feeling bad about yourself - or that you are a failure or have let yourself or your family down? 0-->not at all   Trouble concentrating on things, such as reading the newspaper or watching television? 0-->not at all   Moving or speaking so slowly that other people could have noticed? Or the opposite - being so fidgety or restless that you have been moving around a lot more than usual? 0-->not at all   Thoughts that you would be better off dead, or of hurting yourself in some way? 0-->not at all   PHQ-9 Total Score 6   If you checked off any problems, how difficult have these problems made it for you to do your work, take care of things at home, or get along with other people? somewhat difficult      PRISCILA-7  Feeling nervous, anxious or on edge: Not at all  Not being able to stop or control worrying: Several days  Worrying too much about different things: Several days  Trouble Relaxing: Several days  Being so restless that it is hard to sit still: More than half the days  Feeling afraid as if something awful might happen: More  than half the days  Becoming easily annoyed or irritable: Nearly every day  PRISCIAL 7 Total Score: 10  If you checked any problems, how difficult have these problems made it for you to do your work, take care of things at home, or get along with other people: Somewhat difficult      Client's Support Network Includes:  significant other    Functional Status: Mild impairment     Progress toward goal: Not at goal    Prognosis: Good with Ongoing Treatment     Assessment / Plan / Progress     Visit Diagnosis/Orders Placed This Visit:    ICD-10-CM ICD-9-CM   1. Major depressive disorder, recurrent episode, moderate  F33.1 296.32          PLAN:  Safety: No acute safety concerns  Risk Assessment: Risk of self-harm acutely is low. Risk of self-harm chronically is also low, but could be further elevated in the event of treatment noncompliance and/or AODA.    Treatment Plan/Goals & Progress: Continue supportive psychotherapy efforts and medications as indicated. Treatment options discussed during today's visit. Client ackowledged and verbally consented to continue with current treatment plan and was educated on the importance of compliance with treatment and follow-up appointments. Client seems reasonably able to adhere to treatment plan.      Assisted client in processing above session content; acknowledged and normalized client’s thoughts, feelings, and concerns.  Rationalized client's thought process regarding awareness of changes in mood and motivation to focus on personal goals. Therapist assisted pt in reflecting upon current relationship and differences from previous experiences. Pt recalled history of high school events that particularly lead to engaging in risky behaviors and lowering feelings of self-worth. Pt continues to express frustration with relationship with parents and feeling that she has been treated differently and unfairly. Therapist affirmed pt's willingness to engage and reflect upon previous experience in  effort to further develop awareness of self and how this contributes to achieving and moving towards personal goals. Therapist affirmed pt's progress to being self-reflective and lowering defenses.       Allowed client to freely discuss issues without interruption or judgement with unconditional positive regard, active listening skills, and empathy. Clinician provided a safe, confidential environment to facilitate the development of a positive therapeutic relationship and encouraged open, honest communication. Assisted client in identifying risk factors which would indicate the need for higher level of care including thoughts to harm self or others and/or self-harming behavior and encouraged client to contact this office, call 911, or present to the nearest emergency room should any of these events occur. Discussed crisis intervention services and means to access. Client adamantly and convincingly denies current suicidal or homicidal ideation or perceptual disturbance. Assisted client in processing session content; acknowledged and normalized client’s thoughts, feelings, and concerns by utilizing a person-centered approach in efforts to build appropriate rapport and a positive therapeutic relationship with open and honest communication.     Quality Measures:     TOBACCO USE:  Tobacco Use: Low Risk  (8/14/2024)    Patient History     Smoking Tobacco Use: Never     Smokeless Tobacco Use: Never     Passive Exposure: Not on file      Never smoker    I advised Laura of the risks of tobacco use.     Follow Up:   Return in about 2 weeks (around 9/4/2024).    Verona Deal LCSW  [facility] [address]

## 2024-08-21 NOTE — PLAN OF CARE
Goals of care were discussed to include development of skills and strategies to assist with managing depressive symptoms and impact of low self-esteem.

## 2024-08-21 NOTE — TREATMENT PLAN
Multi-Disciplinary Problems (from Behavioral Health Treatment Plan)      Active Problems       Problem: Depression  Start Date: 08/21/24      Problem Details: The patient self-scales this problem as a 6 with 10 being the worst.          Goal Priority Start Date Expected End Date End Date    Patient will demonstrate the ability to initiate new constructive life skills outside of sessions on a consistent basis. -- 08/21/24 02/19/25 --    Goal Details: Progress toward goal:  Not appropriate to rate progress toward goal since this is the initial treatment plan.          Goal Intervention Frequency Start Date End Date    Assist patient in setting attainable activities of daily living goals. Q3 Months 08/21/24 --      Goal Intervention Frequency Start Date End Date    Provide education about depression Q3 Months 08/21/24 --    Intervention Details: Duration of treatment until discharged.          Goal Intervention Frequency Start Date End Date    Assist patient in developing healthy coping strategies. Q3 Months 08/21/24 --    Intervention Details: Duration of treatment until discharged.                  Problem: Self Esteem  Start Date: 08/21/24      Problem Details: The patient self-scales this problem as a 6 with 10 being the worst.          Goal Priority Start Date Expected End Date End Date    Patient will demonstrate the ability to internalize positive cognitive messages that is also reflected in behavioral changes. -- 08/21/24 02/19/25 --    Goal Details: Progress toward goal:  Not appropriate to rate progress toward goal since this is the initial treatment plan.          Goal Intervention Frequency Start Date End Date    Assist patient in identifying distorted negative beliefs about self and the world. Q3 Months 08/21/24 --    Intervention Details: Duration of treatment until discharged.          Goal Intervention Frequency Start Date End Date    Reinforce use of more realistic positive messages about to self in  interpreting life events. Q3 Months 08/21/24 --    Intervention Details: Duration of treatment until discharged.                          Reviewed By       Verona Deal LCSW 08/21/24 1427                     I have discussed and reviewed this treatment plan with the patient.

## 2024-09-04 ENCOUNTER — OFFICE VISIT (OUTPATIENT)
Age: 19
End: 2024-09-04
Payer: COMMERCIAL

## 2024-09-04 DIAGNOSIS — F33.1 MAJOR DEPRESSIVE DISORDER, RECURRENT EPISODE, MODERATE: Primary | ICD-10-CM

## 2024-09-04 PROCEDURE — 90834 PSYTX W PT 45 MINUTES: CPT

## 2024-09-04 NOTE — PROGRESS NOTES
Follow Up Adult Note     Date:2024   Client Name: Laura Thomas  : 2005   MRN: [id number]   Time IN: 1:37 pm    Time OUT: 2:25 pm     Referring Provider: Torri Morfin MD    Chief Complaint:      ICD-10-CM ICD-9-CM   1. Major depressive disorder, recurrent episode, moderate  F33.1 296.32        History of Present Illness:   Laura Thomas is a 19 y.o. female who is being seen today for follow up individual psychotherapy counseling for ongoing depression. Pt reports increase of depressive symptoms since last visit. Pt acknowledged that some days it is difficult to get out of bed and engage in activities as desired. Pt attributed low mood to feeling uncomfortable in her home and being around others in her family.        Objective     Medications:     Current Outpatient Medications:     fluconazole (Diflucan) 150 MG tablet, Take one now and repeat in 3 days., Disp: 2 tablet, Rfl: 1    valACYclovir (Valtrex) 1000 MG tablet, Take 1 tablet by mouth 2 (Two) Times a Day., Disp: 20 tablet, Rfl: 0    valACYclovir (Valtrex) 1000 MG tablet, Take 1 tablet by mouth Daily. Start after taking treatment dose, Disp: 30 tablet, Rfl: 12    Allergies:   No Known Allergies    Mental Status Exam:   MENTAL STATUS EXAM   General Appearance:  Cleanly groomed and dressed  Eye Contact:  Good eye contact  Attitude:  Cooperative and polite  Motor Activity:  Normal gait, posture  Speech:  Normal rate, tone, volume  Mood and affect:  Normal, pleasant  Thought Process:  Logical, goal-directed and linear  Associations/ Thought Content:  No delusions  Hallucinations:  None  Suicidal Ideations:  Not present  Homicidal Ideation:  Not present  Sensorium:  Alert and clear  Orientation:  Person, place, time and situation  Immediate Recall, Recent, and Remote Memory:  Intact  Attention Span/ Concentration:  Good  Insight:  Good  Judgement:  Good  Reliability:  Good  Impulse Control:  Good       SUICIDE RISK ASSESSMENT/CSSRS:  1. Does  client have thoughts of suicide? Patient denies  2. Does client have intent for suicide? Patient denies  3. Does client have a current plan for suicide? Patient denies   4. History of suicide attempts: Patient denies   5. Family history of suicide or attempts: Patient denies   6. History of violent behaviors towards others or property or thoughts of committing suicide: Patient denies   7. History of sexual aggression toward others: Patient denies   8. Access to firearms or weapons: Patient denies     Subjective     PHQ-9 Depression Screening  Little interest or pleasure in doing things? 1-->several days   Feeling down, depressed, or hopeless? 1-->several days   Trouble falling or staying asleep, or sleeping too much? 2-->more than half the days   Feeling tired or having little energy? 2-->more than half the days   Poor appetite or overeating? 2-->more than half the days   Feeling bad about yourself - or that you are a failure or have let yourself or your family down? 2-->more than half the days   Trouble concentrating on things, such as reading the newspaper or watching television? 1-->several days   Moving or speaking so slowly that other people could have noticed? Or the opposite - being so fidgety or restless that you have been moving around a lot more than usual? 1-->several days   Thoughts that you would be better off dead, or of hurting yourself in some way? 0-->not at all   PHQ-9 Total Score 12   If you checked off any problems, how difficult have these problems made it for you to do your work, take care of things at home, or get along with other people? somewhat difficult      PRISCILA-7  Feeling nervous, anxious or on edge: Not at all  Not being able to stop or control worrying: Not at all  Worrying too much about different things: Several days  Trouble Relaxing: Several days  Being so restless that it is hard to sit still: Several days  Feeling afraid as if something awful might happen: Not at all  Becoming  easily annoyed or irritable: More than half the days  PRISCILA 7 Total Score: 5  If you checked any problems, how difficult have these problems made it for you to do your work, take care of things at home, or get along with other people: Somewhat difficult      Client's Support Network Includes:   friends    Functional Status: Mild impairment     Progress toward goal: Not at goal    Prognosis: Good with Ongoing Treatment     Assessment / Plan / Progress     Visit Diagnosis/Orders Placed This Visit:    ICD-10-CM ICD-9-CM   1. Major depressive disorder, recurrent episode, moderate  F33.1 296.32          PLAN:  Safety: No acute safety concerns  Risk Assessment: Risk of self-harm acutely is low. Risk of self-harm chronically is also low, but could be further elevated in the event of treatment noncompliance and/or AODA.    Treatment Plan/Goals & Progress: Continue supportive psychotherapy efforts and medications as indicated. Treatment options discussed during today's visit. Client ackowledged and verbally consented to continue with current treatment plan and was educated on the importance of compliance with treatment and follow-up appointments. Client seems reasonably able to adhere to treatment plan.      Assisted client in processing above session content; acknowledged and normalized client’s thoughts, feelings, and concerns.  Rationalized client's thought process regarding awareness of ongoing depressive symptoms and impact on behaviors and goals. Therapist assisted pt in reflecting upon recent events that have contributed to difficulty at home. Pt expressed feelings of frustration associated with recent contact from birth father and his girlfriend regarding her brother. Pt expressed desire to protect her brother and acknowledged conflicting feelings associated with having contact with birth father. Therapist assisted pt in reflecting upon attachment/abandonment issues that developed as an infant in regard to pt's drug  use and neglect that continue to impact pt's ability to engage in relationships today. Therapist and pt discussed current relationships struggles and tendency to withdraw, push away or avoid conflict in effort to avoid abandonment. Therapist assisted pt in remaining focused on personal goals toward independence such as driving.     Allowed client to freely discuss issues without interruption or judgement with unconditional positive regard, active listening skills, and empathy. Clinician provided a safe, confidential environment to facilitate the development of a positive therapeutic relationship and encouraged open, honest communication. Assisted client in identifying risk factors which would indicate the need for higher level of care including thoughts to harm self or others and/or self-harming behavior and encouraged client to contact this office, call 911, or present to the nearest emergency room should any of these events occur. Discussed crisis intervention services and means to access. Client adamantly and convincingly denies current suicidal or homicidal ideation or perceptual disturbance. Assisted client in processing session content; acknowledged and normalized client’s thoughts, feelings, and concerns by utilizing a person-centered approach in efforts to build appropriate rapport and a positive therapeutic relationship with open and honest communication.     Quality Measures:     TOBACCO USE:  Tobacco Use: Low Risk  (8/14/2024)    Patient History     Smoking Tobacco Use: Never     Smokeless Tobacco Use: Never     Passive Exposure: Not on file      Never smoker    I advised Laura of the risks of tobacco use.     Follow Up:   Return in about 2 weeks (around 9/18/2024).    Verona Deal LCSW  [facility] [address]

## 2024-09-23 ENCOUNTER — OFFICE VISIT (OUTPATIENT)
Age: 19
End: 2024-09-23
Payer: COMMERCIAL

## 2024-09-23 DIAGNOSIS — F33.1 MAJOR DEPRESSIVE DISORDER, RECURRENT EPISODE, MODERATE: Primary | ICD-10-CM

## 2024-09-23 PROCEDURE — 90834 PSYTX W PT 45 MINUTES: CPT

## 2024-10-07 ENCOUNTER — OFFICE VISIT (OUTPATIENT)
Age: 19
End: 2024-10-07
Payer: COMMERCIAL

## 2024-10-07 DIAGNOSIS — F33.1 MAJOR DEPRESSIVE DISORDER, RECURRENT EPISODE, MODERATE: Primary | ICD-10-CM

## 2024-10-07 PROCEDURE — 90834 PSYTX W PT 45 MINUTES: CPT

## 2024-10-07 NOTE — PROGRESS NOTES
Follow Up Adult Note     Date:10/07/2024   Client Name: Laura Thomas  : 2005   MRN: [id number]   Time IN: 12:36 pm    Time OUT: 1:13 pm     Referring Provider: Torri Morfin MD    Chief Complaint:      ICD-10-CM ICD-9-CM   1. Major depressive disorder, recurrent episode, moderate  F33.1 296.32        History of Present Illness:   Laura Thomas is a 19 y.o. female who is being seen today for follow up individual psychotherapy counseling for ongoing  depression. Pt reports that mood has remained stable since last visit. Pt acknowledged that today is the anniversary of her birth mother's death which has contributed to recent onset of grief emotions and sadness.       Objective     Medications:     Current Outpatient Medications:     fluconazole (Diflucan) 150 MG tablet, Take one now and repeat in 3 days., Disp: 2 tablet, Rfl: 1    valACYclovir (Valtrex) 1000 MG tablet, Take 1 tablet by mouth 2 (Two) Times a Day., Disp: 20 tablet, Rfl: 0    valACYclovir (Valtrex) 1000 MG tablet, Take 1 tablet by mouth Daily. Start after taking treatment dose, Disp: 30 tablet, Rfl: 12    Allergies:   No Known Allergies    Mental Status Exam:   MENTAL STATUS EXAM   General Appearance:  Cleanly groomed and dressed  Eye Contact:  Good eye contact  Attitude:  Cooperative and polite  Motor Activity:  Normal gait, posture  Speech:  Normal rate, tone, volume  Mood and affect:  Normal, pleasant  Thought Process:  Logical, goal-directed and linear  Associations/ Thought Content:  No delusions  Hallucinations:  None  Suicidal Ideations:  Not present  Homicidal Ideation:  Not present  Sensorium:  Alert  Orientation:  Person, time, place and situation  Immediate Recall, Recent, and Remote Memory:  Intact  Attention Span/ Concentration:  Good  Insight:  Good  Judgement:  Good  Reliability:  Good  Impulse Control:  Good       SUICIDE RISK ASSESSMENT/CSSRS:  1. Does client have thoughts of suicide? Patient denies  2. Does client have  intent for suicide? Patient denies  3. Does client have a current plan for suicide? Patient denies   4. History of suicide attempts: Patient denies   5. Family history of suicide or attempts: Patient denies   6. History of violent behaviors towards others or property or thoughts of committing suicide: Patient denies   7. History of sexual aggression toward others: Patient denies   8. Access to firearms or weapons: Patient denies     Subjective     PHQ-9 Depression Screening  Little interest or pleasure in doing things? 1-->several days   Feeling down, depressed, or hopeless? 2-->more than half the days   Trouble falling or staying asleep, or sleeping too much? 3-->nearly every day   Feeling tired or having little energy? 3-->nearly every day   Poor appetite or overeating? 2-->more than half the days   Feeling bad about yourself - or that you are a failure or have let yourself or your family down? 0-->not at all   Trouble concentrating on things, such as reading the newspaper or watching television? 0-->not at all   Moving or speaking so slowly that other people could have noticed? Or the opposite - being so fidgety or restless that you have been moving around a lot more than usual? 0-->not at all   Thoughts that you would be better off dead, or of hurting yourself in some way? 0-->not at all   PHQ-9 Total Score 11   If you checked off any problems, how difficult have these problems made it for you to do your work, take care of things at home, or get along with other people? somewhat difficult      PRISCILA-7  Feeling nervous, anxious or on edge: More than half the days  Not being able to stop or control worrying: More than half the days  Worrying too much about different things: More than half the days  Trouble Relaxing: More than half the days  Being so restless that it is hard to sit still: More than half the days  Feeling afraid as if something awful might happen: More than half the days  Becoming easily annoyed or  "irritable: More than half the days  PRISCILA 7 Total Score: 14  If you checked any problems, how difficult have these problems made it for you to do your work, take care of things at home, or get along with other people: Very difficult      Client's Support Network Includes:  parents    Functional Status: Mild impairment     Progress toward goal: Not at goal    Prognosis:  Good    Assessment / Plan / Progress     Visit Diagnosis/Orders Placed This Visit:    ICD-10-CM ICD-9-CM   1. Major depressive disorder, recurrent episode, moderate  F33.1 296.32          PLAN:  Safety: No acute safety concerns  Risk Assessment: Risk of self-harm acutely is low. Risk of self-harm chronically is also low, but could be further elevated in the event of treatment noncompliance and/or AODA.    Treatment Plan/Goals & Progress: Continue supportive psychotherapy efforts and medications as indicated. Treatment options discussed during today's visit. Client ackowledged and verbally consented to continue with current treatment plan and was educated on the importance of compliance with treatment and follow-up appointments. Client seems reasonably able to adhere to treatment plan.      Assisted client in processing above session content; acknowledged and normalized client’s thoughts, feelings, and concerns.  Rationalized client's thought process regarding awareness of ongoing life events and stressors that impact mood and engagement in activities. Pt shared that she is processing through anniversary of birth mother's passing and recent passing of a peer. Pt expressed appropriate awareness and feelings of grief and complicated nature of the relationship with her birth mother which leads to unanswered questions and unresolved feelings. Pt shared that she has been upset this week with parents for leaving on a trip to [place] without providing support or knowledge to pt. Pt recalled events from childhood that contributed to feelings of being \"unwanted\". " Therapist provided empathetic, supportive listening as pt recalled difficult feelings and memories. Pt shared that she is spending time with her grandfather this week and enjoys his presence and support. Pt shared that she remains focused on goals of obtaining a drivers license so she can obtain her own apartment. Therapist encouraged pt to remain focused on goals and engage in active participation to achieve steps toward goals.     Allowed client to freely discuss issues without interruption or judgement with unconditional positive regard, active listening skills, and empathy. Clinician provided a safe, confidential environment to facilitate the development of a positive therapeutic relationship and encouraged open, honest communication. Assisted client in identifying risk factors which would indicate the need for higher level of care including thoughts to harm self or others and/or self-harming behavior and encouraged client to contact this office, call 911, or present to the nearest emergency room should any of these events occur. Discussed crisis intervention services and means to access. Client adamantly and convincingly denies current suicidal or homicidal ideation or perceptual disturbance. Assisted client in processing session content; acknowledged and normalized client’s thoughts, feelings, and concerns by utilizing a person-centered approach in efforts to build appropriate rapport and a positive therapeutic relationship with open and honest communication.     Quality Measures:     TOBACCO USE:  Tobacco Use: Low Risk  (8/14/2024)    Patient History     Smoking Tobacco Use: Never     Smokeless Tobacco Use: Never     Passive Exposure: Not on file      Never smoker    I advised Laura of the risks of tobacco use.     Follow Up:   Return in about 2 weeks (around 10/21/2024).    Verona Deal LCSW  [facility] [address]

## 2024-10-22 ENCOUNTER — OFFICE VISIT (OUTPATIENT)
Age: 19
End: 2024-10-22
Payer: COMMERCIAL

## 2024-10-22 DIAGNOSIS — F33.1 MAJOR DEPRESSIVE DISORDER, RECURRENT EPISODE, MODERATE: Primary | ICD-10-CM

## 2024-10-22 NOTE — PROGRESS NOTES
Follow Up Adult Note     Date:10/22/2024   Client Name: Laura Thomas  : 2005   MRN: [id number]   Time IN: 12:35 pm    Time OUT: 1:17 pm     Referring Provider: Torri Morfin MD    Chief Complaint:      ICD-10-CM ICD-9-CM   1. Major depressive disorder, recurrent episode, moderate  F33.1 296.32        History of Present Illness:   Laura Thomas is a 19 y.o. female who is being seen today for follow up individual psychotherapy counseling for ongoing depression. Pt reports experiencing periods of low mood since last visit. Pt acknowledged ongoing stress related to personal goals and relationship issues.        Objective     Medications:     Current Outpatient Medications:     fluconazole (Diflucan) 150 MG tablet, Take one now and repeat in 3 days., Disp: 2 tablet, Rfl: 1    valACYclovir (Valtrex) 1000 MG tablet, Take 1 tablet by mouth 2 (Two) Times a Day., Disp: 20 tablet, Rfl: 0    valACYclovir (Valtrex) 1000 MG tablet, Take 1 tablet by mouth Daily. Start after taking treatment dose, Disp: 30 tablet, Rfl: 12    Allergies:   No Known Allergies    Mental Status Exam:   MENTAL STATUS EXAM   General Appearance:  Cleanly groomed and dressed  Eye Contact:  Good eye contact  Attitude:  Cooperative and polite  Motor Activity:  Normal gait, posture  Speech:  Normal rate, tone, volume  Mood and affect:  Normal, pleasant  Thought Process:  Logical, goal-directed and linear  Associations/ Thought Content:  No delusions  Hallucinations:  None  Suicidal Ideations:  Not present  Homicidal Ideation:  Not present  Sensorium:  Alert  Orientation:  Person, place, situation and time  Immediate Recall, Recent, and Remote Memory:  Intact  Attention Span/ Concentration:  Good  Insight:  Good  Judgement:  Good  Reliability:  Good  Impulse Control:  Good       SUICIDE RISK ASSESSMENT/CSSRS:  1. Does client have thoughts of suicide? Patient denies  2. Does client have intent for suicide? Patient denies  3. Does client have a  current plan for suicide? Patient denies   4. History of suicide attempts: Patient denies   5. Family history of suicide or attempts: Patient denies   6. History of violent behaviors towards others or property or thoughts of committing suicide: Patient denies   7. History of sexual aggression toward others: Patient denies   8. Access to firearms or weapons: Patient denies     Subjective     PHQ-9 Depression Screening  Little interest or pleasure in doing things? Almost all   Feeling down, depressed, or hopeless? Several days   PHQ-2 Total Score 4   Trouble falling or staying asleep, or sleeping too much? Almost all   Feeling tired or having little energy? Almost all   Poor appetite or overeating? Not at all   Feeling bad about yourself - or that you are a failure or have let yourself or your family down? Not at all   Trouble concentrating on things, such as reading the newspaper or watching television? Not at all   Moving or speaking so slowly that other people could have noticed? Or the opposite - being so fidgety or restless that you have been moving around a lot more than usual? Not at all   Thoughts that you would be better off dead, or of hurting yourself in some way? Not at all   PHQ-9 Total Score 10   If you checked off any problems, how difficult have these problems made it for you to do your work, take care of things at home, or get along with other people? Somewhat difficult         PRISCILA-7  Feeling nervous, anxious or on edge: Nearly every day  Not being able to stop or control worrying: More than half the days  Worrying too much about different things: Nearly every day  Trouble Relaxing: Not at all  Being so restless that it is hard to sit still: Not at all  Feeling afraid as if something awful might happen: More than half the days  Becoming easily annoyed or irritable: Nearly every day  PRISCILA 7 Total Score: 13  If you checked any problems, how difficult have these problems made it for you to do your work,  take care of things at home, or get along with other people: Somewhat difficult      Client's Support Network Includes:  parents    Functional Status: Mild impairment     Progress toward goal: Not at goal    Prognosis: Good with Ongoing Treatment     Assessment / Plan / Progress     Visit Diagnosis/Orders Placed This Visit:    ICD-10-CM ICD-9-CM   1. Major depressive disorder, recurrent episode, moderate  F33.1 296.32          PLAN:  Safety: No acute safety concerns  Risk Assessment: Risk of self-harm acutely is low. Risk of self-harm chronically is also low, but could be further elevated in the event of treatment noncompliance and/or AODA.    Treatment Plan/Goals & Progress: Continue supportive psychotherapy efforts and medications as indicated. Treatment options discussed during today's visit. Client ackowledged and verbally consented to continue with current treatment plan and was educated on the importance of compliance with treatment and follow-up appointments. Client seems reasonably able to adhere to treatment plan.      Assisted client in processing above session content; acknowledged and normalized client’s thoughts, feelings, and concerns.  Rationalized client's thought process regarding awareness of increased low mood and lack of motivation. Therapist assisted pt in recalling recent events and specific stressors. Pt acknowledged that she continues to feel frustrated by the pressure from parents to move out, get a job, and obtain a license. Pt shared that mother is concerned about reclusive behavior which pt states is a result of feeling uncomfortable in her home. Therapist assisted pt in identifying additional barriers to achieving goals. Pt acknowledged fear of loneliness and desire to move out of the area due to past reputation. Pt acknowledged complex feelings associated with thoughts of her birth mother and comparisons from family with her behavior. Therapist validated pt's feelings and affirmed pt's  "reflection on to barriers. Pt discussed additional concerns related to relationship status and difficulty in accepting \"comfort and love\" in this relationship. Therapist and pt discussed pt's self-sabotaging behaviors due to new conditions and uncomfortable feelings of acceptance. Therapist continues to encourage pt to identify small goals to assist with achieving ultimate goal of moving out.       Allowed client to freely discuss issues without interruption or judgement with unconditional positive regard, active listening skills, and empathy. Clinician provided a safe, confidential environment to facilitate the development of a positive therapeutic relationship and encouraged open, honest communication. Assisted client in identifying risk factors which would indicate the need for higher level of care including thoughts to harm self or others and/or self-harming behavior and encouraged client to contact this office, call 911, or present to the nearest emergency room should any of these events occur. Discussed crisis intervention services and means to access. Client adamantly and convincingly denies current suicidal or homicidal ideation or perceptual disturbance. Assisted client in processing session content; acknowledged and normalized client’s thoughts, feelings, and concerns by utilizing a person-centered approach in efforts to build appropriate rapport and a positive therapeutic relationship with open and honest communication.     Quality Measures:     TOBACCO USE:  Tobacco Use: Low Risk  (8/14/2024)    Patient History     Smoking Tobacco Use: Never     Smokeless Tobacco Use: Never     Passive Exposure: Not on file      Never smoker    I advised Laura of the risks of tobacco use.     Follow Up:   Return in about 2 weeks (around 11/5/2024).    Verona Deal LCSW  [facility] [address]   "

## 2024-11-05 ENCOUNTER — OFFICE VISIT (OUTPATIENT)
Age: 19
End: 2024-11-05
Payer: COMMERCIAL

## 2024-11-05 DIAGNOSIS — F33.1 MAJOR DEPRESSIVE DISORDER, RECURRENT EPISODE, MODERATE: Primary | ICD-10-CM

## 2024-11-05 NOTE — PROGRESS NOTES
Follow Up Adult Note     Date:2024   Client Name: Laura Thomas  : 2005   MRN: [id number]   Time IN: 12:35 pm    Time OUT: 1:15 pm     Referring Provider: Torri Morfin MD    Chief Complaint:      ICD-10-CM ICD-9-CM   1. Major depressive disorder, recurrent episode, moderate  F33.1 296.32        History of Present Illness:   Laura Thomas is a 19 y.o. female who is being seen today for follow up individual psychotherapy counseling for ongoing depression. Pt reports that mood has recently been low due to recent relationship issues. Pt acknowledged complex feelings regarding the end of the relationship which has contributed to increased sadness.        Objective     Medications:     Current Outpatient Medications:     fluconazole (Diflucan) 150 MG tablet, Take one now and repeat in 3 days., Disp: 2 tablet, Rfl: 1    valACYclovir (Valtrex) 1000 MG tablet, Take 1 tablet by mouth 2 (Two) Times a Day., Disp: 20 tablet, Rfl: 0    valACYclovir (Valtrex) 1000 MG tablet, Take 1 tablet by mouth Daily. Start after taking treatment dose, Disp: 30 tablet, Rfl: 12    Allergies:   No Known Allergies    Mental Status Exam:   MENTAL STATUS EXAM   General Appearance:  Cleanly groomed and dressed  Eye Contact:  Good eye contact  Attitude:  Cooperative and polite  Motor Activity:  Normal gait, posture  Speech:  Normal rate, tone, volume  Mood and affect:  Normal, pleasant  Thought Process:  Logical, goal-directed and linear  Associations/ Thought Content:  No delusions  Hallucinations:  None  Suicidal Ideations:  Not present  Homicidal Ideation:  Not present  Sensorium:  Alert and clear  Orientation:  Person, place, situation and time  Immediate Recall, Recent, and Remote Memory:  Intact  Attention Span/ Concentration:  Good  Insight:  Good  Judgement:  Fair  Reliability:  Good  Impulse Control:  Fair       SUICIDE RISK ASSESSMENT/CSSRS:  1. Does client have thoughts of suicide? Patient denies  2. Does client have  intent for suicide? Patient denies  3. Does client have a current plan for suicide? Patient denies   4. History of suicide attempts: Patient denies   5. Family history of suicide or attempts: Patient denies   6. History of violent behaviors towards others or property or thoughts of committing suicide: Patient denies   7. History of sexual aggression toward others: Patient denies   8. Access to firearms or weapons: Patient denies     Subjective     PHQ-9 Depression Screening  Little interest or pleasure in doing things? Several days   Feeling down, depressed, or hopeless? Over half   PHQ-2 Total Score 3   Trouble falling or staying asleep, or sleeping too much? Almost all   Feeling tired or having little energy? Several days   Poor appetite or overeating? Over half   Feeling bad about yourself - or that you are a failure or have let yourself or your family down? Over half   Trouble concentrating on things, such as reading the newspaper or watching television? Not at all   Moving or speaking so slowly that other people could have noticed? Or the opposite - being so fidgety or restless that you have been moving around a lot more than usual? Not at all   Thoughts that you would be better off dead, or of hurting yourself in some way? Not at all   PHQ-9 Total Score 11   If you checked off any problems, how difficult have these problems made it for you to do your work, take care of things at home, or get along with other people? Somewhat difficult         PRISCILA-7  Feeling nervous, anxious or on edge: More than half the days  Not being able to stop or control worrying: More than half the days  Worrying too much about different things: More than half the days  Trouble Relaxing: More than half the days  Being so restless that it is hard to sit still: Not at all  Feeling afraid as if something awful might happen: Not at all  Becoming easily annoyed or irritable: Nearly every day  PRISCILA 7 Total Score: 11  If you checked any  problems, how difficult have these problems made it for you to do your work, take care of things at home, or get along with other people: Somewhat difficult      Client's Support Network Includes:  parents    Functional Status: Mild impairment     Progress toward goal: Not at goal    Prognosis: Good with Ongoing Treatment     Assessment / Plan / Progress     Visit Diagnosis/Orders Placed This Visit:    ICD-10-CM ICD-9-CM   1. Major depressive disorder, recurrent episode, moderate  F33.1 296.32          PLAN:  Safety: No acute safety concerns  Risk Assessment: Risk of self-harm acutely is low. Risk of self-harm chronically is also low, but could be further elevated in the event of treatment noncompliance and/or AODA.    Treatment Plan/Goals & Progress: Continue supportive psychotherapy efforts and medications as indicated. Treatment options discussed during today's visit. Client ackowledged and verbally consented to continue with current treatment plan and was educated on the importance of compliance with treatment and follow-up appointments. Client seems reasonably able to adhere to treatment plan.      Assisted client in processing above session content; acknowledged and normalized client’s thoughts, feelings, and concerns.  Rationalized client's thought process regarding awareness of recent mood change and increased feelings of sadness due to recent breakup. Therapist provided supportive listening as pt recalled recent events that contributed to break up. Therapist assisted pt in clarifying patterns of behavior in relationships and their origins. Therapist and pt discussed aspects of the relationship that are being missed and ability to take information received through this relationship into future. Pt acknowledged that she would like to focus more on personal goals at this time and is aware that recent relationship was a distraction. Therapist encouraged pt to consider establishing small, obtainable goals to  assist with completing goals such as obtaining her drivers license. Pt was hesitant with provide specific goals or deadlines at this time.     Allowed client to freely discuss issues without interruption or judgement with unconditional positive regard, active listening skills, and empathy. Clinician provided a safe, confidential environment to facilitate the development of a positive therapeutic relationship and encouraged open, honest communication. Assisted client in identifying risk factors which would indicate the need for higher level of care including thoughts to harm self or others and/or self-harming behavior and encouraged client to contact this office, call 911, or present to the nearest emergency room should any of these events occur. Discussed crisis intervention services and means to access. Client adamantly and convincingly denies current suicidal or homicidal ideation or perceptual disturbance. Assisted client in processing session content; acknowledged and normalized client’s thoughts, feelings, and concerns by utilizing a person-centered approach in efforts to build appropriate rapport and a positive therapeutic relationship with open and honest communication.     Quality Measures:     TOBACCO USE:  Tobacco Use: Low Risk  (8/14/2024)    Patient History     Smoking Tobacco Use: Never     Smokeless Tobacco Use: Never     Passive Exposure: Not on file      Never smoker    I advised Laura of the risks of tobacco use.     Follow Up:   Return in about 2 weeks (around 11/19/2024).    Verona Deal LCSW  [facility] [address]

## 2024-12-11 ENCOUNTER — TELEMEDICINE (OUTPATIENT)
Age: 19
End: 2024-12-11
Payer: COMMERCIAL

## 2024-12-11 DIAGNOSIS — F33.1 MAJOR DEPRESSIVE DISORDER, RECURRENT EPISODE, MODERATE: Primary | ICD-10-CM

## 2024-12-11 NOTE — PROGRESS NOTES
Telehealth  (Video) Visit      Patient Name: Laura Thomas  : 2005   MRN: [id number]   Time In: 1:30pm      Time Out: 2:08pm     Referring Physician: Torri Morfin MD    Chief Complaint:      ICD-10-CM ICD-9-CM   1. Major depressive disorder, recurrent episode, moderate  F33.1 296.32        This provider is located at home address on file. This visit is being done on behalf of [facility] [address], [address], [address], [place]. [address], using a [employer] platform for a video visit in a secure and private environment. The Patient is seen remotely at their home address in [place], using a private computer, phone or tablet.  The patient is able to be seen through a MyChart Video Visit through "[employer] at today's encounter. Patient is being evaluated/treated via telehealth by Zoom, and stated they are in a secure environment for this session. The patient's condition being diagnosed/treated is appropriate for telemedicine. The provider identified herself as well as her credentials.   The patient, and/or patient's guardian, consent to being seen remotely, and when consent is given they understand that the consent allows for patient identifiable information to be sent to a third party as needed.   They may refuse to be seen remotely at any time. The electronic data is encrypted and password protected, and the patient and/or guardian has been advised of the potential risks to privacy not withstanding such measures.    You have chosen to receive care through a video visit today. Do you consent to use a video visit for your medical care today? yes  This visit has been scheduled as a video visit to comply with patient safety concerns in accordance with CDC recommendations.    Mode of Visit: Video  Location of patient: -HOME-  Location of provider: +[facility]+  You have chosen to receive care through a telehealth visit.  The patient has signed the video visit consent form.  The visit  included audio and video interaction. No technical issues occurred during this visit.      History of Present Illness: Laura Thomas is a 19 y.o. female who I saw today thru a video visit for ongoing depression. Pt reports that mood has remained stable since last visit but acknowledges awareness of periods of low mood. Pt states that she has been engaging in social outings frequently which has created distraction and engagement outside of the home environment.     Objective      PHQ-9 Depression Screening  Little interest or pleasure in doing things? (Patient-Rptd) Several days   Feeling down, depressed, or hopeless? (Patient-Rptd) Several days   PHQ-2 Total Score (Patient-Rptd) 2   Trouble falling or staying asleep, or sleeping too much? (Patient-Rptd) Several days   Feeling tired or having little energy? (Patient-Rptd) Several days   Poor appetite or overeating? (Patient-Rptd) Several days   Feeling bad about yourself - or that you are a failure or have let yourself or your family down? (Patient-Rptd) Over half   Trouble concentrating on things, such as reading the newspaper or watching television? (Patient-Rptd) Not at all   Moving or speaking so slowly that other people could have noticed? Or the opposite - being so fidgety or restless that you have been moving around a lot more than usual? (Patient-Rptd) Not at all   Thoughts that you would be better off dead, or of hurting yourself in some way? (Patient-Rptd) Not at all   PHQ-9 Total Score (Patient-Rptd) 7   If you checked off any problems, how difficult have these problems made it for you to do your work, take care of things at home, or get along with other people? (Patient-Rptd) Somewhat difficult        PRISCILA-7  Feeling nervous, anxious or on edge: (Patient-Rptd) Several days  Not being able to stop or control worrying: (Patient-Rptd) Not at all  Worrying too much about different things: (Patient-Rptd) Several days  Trouble Relaxing: (Patient-Rptd) Not at  all  Being so restless that it is hard to sit still: (Patient-Rptd) Not at all  Feeling afraid as if something awful might happen: (Patient-Rptd) Not at all  Becoming easily annoyed or irritable: (Patient-Rptd) Several days  PRISCILA 7 Total Score: (Patient-Rptd) 3  If you checked any problems, how difficult have these problems made it for you to do your work, take care of things at home, or get along with other people: (Patient-Rptd) Somewhat difficult        Medications:     Current Outpatient Medications:     fluconazole (Diflucan) 150 MG tablet, Take one now and repeat in 3 days., Disp: 2 tablet, Rfl: 1    valACYclovir (Valtrex) 1000 MG tablet, Take 1 tablet by mouth 2 (Two) Times a Day., Disp: 20 tablet, Rfl: 0    valACYclovir (Valtrex) 1000 MG tablet, Take 1 tablet by mouth Daily. Start after taking treatment dose, Disp: 30 tablet, Rfl: 12      Subjective       Mental Status Exam:   MENTAL STATUS EXAM   General Appearance:  Cleanly groomed and dressed  Eye Contact:  Good eye contact  Attitude:  Cooperative and polite  Motor Activity:  Normal gait, posture  Speech:  Normal rate, tone, volume  Mood and affect:  Normal, pleasant  Thought Process:  Logical and goal-directed  Associations/ Thought Content:  No delusions  Hallucinations:  None  Suicidal Ideations:  Not present  Homicidal Ideation:  Not present  Sensorium:  Alert and clear  Orientation:  Person, place, time and situation  Immediate Recall, Recent, and Remote Memory:  Intact  Attention Span/ Concentration:  Good  Insight:  Good  Judgement:  Good  Reliability:  Good  Impulse Control:  Good      Assessment / Plan      Visit Diagnosis/Orders Placed This Visit:    ICD-10-CM ICD-9-CM   1. Major depressive disorder, recurrent episode, moderate  F33.1 296.32          PLAN:  Safety: No acute safety concerns  Risk Assessment: Risk of self-harm acutely is low. Risk of self-harm chronically is also low, but could be further elevated in the event of treatment  "noncompliance and/or AODA.    TREATMENT PLAN/GOALS: Continue supportive psychotherapy efforts and medications as indicated. Treatment options discussed during today's visit. Patient ackowledged and verbally consented to continue with current treatment plan and was educated on the importance of compliance with treatment and follow-up appointments. Patient seems reasonably able to adhere to treatment plan.      Therapist provided supportive, empathetic listening as pt recalled recent events. Pt states that she has been engaging in lots of \"partying and wants to calm down.\" Pt states that recent break up was difficult and impacted her mood/emotions than she has previously thought. Therapist assisted pt in reflection upon reasons why she has engaged in these activities as a result of dealing with difficult emotions. Pt acknowledged the desire to feel \"wanted\" by others. Therapist assisted pt in reflecting upon strategies or accountability measures to assist with limiting behaviors and creating some space for pt. Therapist and pt discussed potential triggers or feelings of missing out that can be challenging in the moment. Therapist assisted pt in identifying specific goals to achieve goals through stating specific behaviors that she will/will not engage in to assist with creating self-accountability and awareness. Pt was pleased to shared that she has an appt scheduled for drivers license which is the \"1st step\" towards moving out. Therapist affirmed pt's progress towards completing goal and encouraged pt to consider additional strategies discussed as additional supports/goals towards her future goals of independence.     Allowed Patient to freely discuss issues  without interruption or judgement with unconditional positive regard, active listening skills, and empathy. Therapist provided a safe, confidential environment to facilitate the development of a positive therapeutic relationship and encouraged open, honest " communication. Assisted Patient in identifying risk factors which would indicate the need for higher level of care including thoughts to harm self or others and/or self-harming behavior and encouraged Patient to contact this office, call 911, or present to the nearest emergency room should any of these events occur. Discussed crisis intervention services and means to access. Patient adamantly and convincingly denies current suicidal or homicidal ideation or perceptual disturbance. Assisted Patient in processing session content; acknowledged and normalized Patient’s thoughts, feelings, and concerns by utilizing a person-centered approach in efforts to build appropriate rapport and a positive therapeutic relationship with open and honest communication.     Quality Measures:     TOBACCO USE:  Tobacco Use: Low Risk  (8/14/2024)    Patient History     Smoking Tobacco Use: Never     Smokeless Tobacco Use: Never     Passive Exposure: Not on file      Never smoker    I advised Laura of the risks of tobacco use    Follow Up:   Return in about 4 weeks (around 1/8/2025).      Verona Deal LCSW  [facility] [address]

## 2024-12-13 NOTE — PLAN OF CARE
Goals of care were discussed and updated to continue development of skills and strategies to assist with managing depressive symptoms and impact of low self-esteem.

## 2024-12-13 NOTE — TREATMENT PLAN
Multi-Disciplinary Problems (from Behavioral Health Treatment Plan)      Active Problems       Problem: Depression  Start Date: 12/13/24      Problem Details: The patient self-scales this problem as a 7 with 10 being the worst.          Goal Priority Start Date Expected End Date End Date    Patient will demonstrate the ability to initiate new constructive life skills outside of sessions on a consistent basis. -- 12/13/24 06/13/25 --    Goal Details: Progress toward goal:  The patient self-scales their progress related to this goal as a 7 with 10 being the worst.          Goal Intervention Frequency Start Date End Date    Assist patient in setting attainable activities of daily living goals. Q3 Months 12/13/24 --      Goal Intervention Frequency Start Date End Date    Provide education about depression Q3 Months 12/13/24 --    Intervention Details: Duration of treatment until discharged.          Goal Intervention Frequency Start Date End Date    Assist patient in developing healthy coping strategies. Q3 Months 12/13/24 --    Intervention Details: Duration of treatment until discharged.                  Problem: Self Esteem  Start Date: 12/13/24      Problem Details: The patient self-scales this problem as a 8 with 10 being the worst.          Goal Priority Start Date Expected End Date End Date    Patient will demonstrate the ability to internalize positive cognitive messages that is also reflected in behavioral changes. -- 12/13/24 06/13/25 --    Goal Details: Progress toward goal:  The patient self-scales their progress related to this goal as a 8 with 10 being the worst.          Goal Intervention Frequency Start Date End Date    Assist patient in identifying distorted negative beliefs about self and the world. Q3 Months 12/13/24 --    Intervention Details: Duration of treatment until discharged.          Goal Intervention Frequency Start Date End Date    Reinforce use of more realistic positive messages about to  self in interpreting life events. Q3 Months 12/13/24 --    Intervention Details: Duration of treatment until discharged.                          Reviewed By       Verona Deal LCSW 12/13/24 [id number]                     I have discussed and reviewed this treatment plan with the patient.